# Patient Record
Sex: FEMALE | Race: WHITE
[De-identification: names, ages, dates, MRNs, and addresses within clinical notes are randomized per-mention and may not be internally consistent; named-entity substitution may affect disease eponyms.]

---

## 2017-07-20 ENCOUNTER — HOSPITAL ENCOUNTER (OUTPATIENT)
Dept: HOSPITAL 75 - RAD | Age: 52
End: 2017-07-20
Attending: FAMILY MEDICINE
Payer: COMMERCIAL

## 2017-07-20 DIAGNOSIS — Z12.31: Primary | ICD-10-CM

## 2017-07-20 PROCEDURE — 77067 SCR MAMMO BI INCL CAD: CPT

## 2017-07-26 NOTE — DIAGNOSTIC IMAGING REPORT
Bilateral screening mammogram 2D views with tomosynthesis.



The current study was also evaluated with a Computer Aided

Detection (CAD) system.



INDICATION: Screening. No current complaints stated on the

questionnaire.



COMPARISON: 7/18/16.



FINDINGS:



The breasts are composed of heterogeneously dense parenchyma

which would decrease mammographic sensitivity. Benign-appearing

calcification seen. Allowing for technique and positional

differences, no suspicious change is seen.



IMPRESSION: Dense breasts with no definite change. 



ACR BI-RADS Category 2: Benign findings.

Result letter will be mailed to the patient.

Note: At least 10% of breast cancer is not imaged by mammography.



Dictated by: 



  Dictated on workstation # [id number]

## 2018-03-11 ENCOUNTER — HOSPITAL ENCOUNTER (EMERGENCY)
Dept: HOSPITAL 75 - ER | Age: 53
Discharge: HOME | End: 2018-03-11
Payer: COMMERCIAL

## 2018-03-11 VITALS — WEIGHT: 175 LBS | BODY MASS INDEX: 29.16 KG/M2 | HEIGHT: 65 IN

## 2018-03-11 VITALS — DIASTOLIC BLOOD PRESSURE: 85 MMHG | SYSTOLIC BLOOD PRESSURE: 125 MMHG

## 2018-03-11 DIAGNOSIS — N39.0: Primary | ICD-10-CM

## 2018-03-11 DIAGNOSIS — Z88.0: ICD-10-CM

## 2018-03-11 DIAGNOSIS — Z88.1: ICD-10-CM

## 2018-03-11 LAB
APTT PPP: YELLOW S
BACTERIA #/AREA URNS HPF: (no result) /HPF
BILIRUB UR QL STRIP: NEGATIVE
FIBRINOGEN PPP-MCNC: (no result) MG/DL
GLUCOSE UR STRIP-MCNC: NEGATIVE MG/DL
KETONES UR QL STRIP: NEGATIVE
LEUKOCYTE ESTERASE UR QL STRIP: (no result)
NITRITE UR QL STRIP: NEGATIVE
PH UR STRIP: 6 [PH] (ref 5–9)
PROT UR QL STRIP: NEGATIVE
RBC #/AREA URNS HPF: >100 /HPF
SP GR UR STRIP: 1.02 (ref 1.02–1.02)
SQUAMOUS #/AREA URNS HPF: (no result) /HPF
UROBILINOGEN UR-MCNC: NORMAL MG/DL

## 2018-03-11 PROCEDURE — 87088 URINE BACTERIA CULTURE: CPT

## 2018-03-11 PROCEDURE — 81000 URINALYSIS NONAUTO W/SCOPE: CPT

## 2018-03-11 PROCEDURE — 99283 EMERGENCY DEPT VISIT LOW MDM: CPT

## 2018-03-11 NOTE — ED GU-FEMALE
General


Chief Complaint:  -Female


Stated Complaint:  RIGHT SIDE LOWER BACK PAIN


Nursing Triage Note:  


PATIENT STATES THAT SHE STARTED HAVING RIDED LOWER BACK PAIN THAT STARTED AT 


1845 TONIGHT. SHE STATES THAT SHE FEELS LIKE SHE NEEDS TO URINATE FREQUENTLY 

BUT 


CAN ONLY GO SMALL AMOUNTS AT A TIME. SHE ADMITS TO BURNING WITH URINATION. SHE 


IS NAUSEATED.


Nursing Sepsis Screen:  No Definite Risk





History of Present Illness


Date Seen by Provider:  Mar 11, 2018


Time Seen by Provider:  21:45


Initial Comments


52-year-old  female presents for acute onset of right-sided low back 

pain and suprapubic pain. She reports polyuria and dysuria. She reports no 

history of recent urinary tract infections.


Timing/Duration:  this evening


Severity/Quality:  moderate


Location:  suprapubic, right flank


Activities at Onset:  none


Prior Genitourinary Problems:  none


Associated Symptoms:  abdominal pain, dysuria, lower back pain, polyuria, 

urinary frequency





Allergies and Home Medications


Allergies


Coded Allergies:  


     Penicillins (Verified  Allergy, Unknown, RASH, 6/9/16)


     amoxicillin (Verified  Allergy, Unknown, RASH, 6/9/16)


     ampicillin (Verified  Allergy, Unknown, RASH, 6/9/16)





Home Medications


Amlodipine Besylate 10 Mg Tablet, 10 MG PO DAILY, (Reported)


Benazepril HCl 40 Mg Tab, 40 MG PO DAILY, (Reported)


Calcium Carbonate 500 Mg Tablet, 500 MG PO DAILY, (Reported)


Cholecalciferol (Vitamin D3) 1,000 Unit Tablet, 1,000 UNIT PO DAILY, (Reported)


Fenofibrate,Micronized 134 Mg Capsule, 134 MG PO DAILY, (Reported)


Hydrochlorothiazide 12.5 Mg Tablet, 12.5 MG PO DAILY, (Reported)


Loratadine 10 Mg Tablet, 10 MG PO DAILY, (Reported)


Multivitamin 1 Each Tablet, 1 EACH PO DAILY, (Reported)


Omega 3 Polyunsat Fatty Acids 1,000 Mg Cap, 1,000 MG PO DAILY, (Reported)


Phenazopyridine HCl 100 Mg Tablet, 100 MG PO Q8H PRN for SPASMS


   Prescribed by: NOVA FORTUNE on 3/11/18 2201


Sulfamethoxazole/Trimethoprim 1 Each Tablet, 1 EACH PO BID


   Prescribed by: NOVA FORTUNE on 3/11/18 2201





Patient Home Medication List


Home Medication List Reviewed:  Yes





Constitutional:  no symptoms reported, see HPI


Genitourinary:  see HPI, burning, dysuria, frequency, pain, urgency


All Other Systemes Reviewed


Negative Unless Noted:  Yes





Past Medical-Social-Family Hx


Patient Social History


Alcohol Use:  Denies Use


Recreational Drug Use:  No


Smoking Status:  Never a Smoker


2nd Hand Smoke Exposure:  No


Recent Foreign Travel:  No


Contact w/Someone Who Travel:  No


Recent Infectious Disease Expo:  No


Recent Hopitalizations:  No


Physical Abuse:  No


Sexual Abuse:  No





Seasonal Allergies


Seasonal Allergies:  No





Surgeries


History of Surgeries:  Yes





Respiratory


History of Respiratory Disorde:  No





Cardiovascular


History of Cardiac Disorders:  No





Gastrointestinal


History of Gastrointestinal Di:  No





Musculoskeletal


History of Musculoskeletal Dis:  No





Endocrine


History of Endocrine Disorders:  No





Psychosocial


Suicide Risk Score:  0





Reviewed Nursing Assessment


Reviewed/Agree w Nursing PMH:  Yes





Physical Exam


Vital Signs





Vital Signs - First Documented








 3/11/18





 21:13


 


Temp 97.3


 


Pulse 84


 


Resp 18


 


B/P (MAP) 125/85 (98)


 


Pulse Ox 99


 


O2 Delivery Room Air





Capillary Refill : Less Than 3 Seconds


General Appearance:  WD/WN, no apparent distress


HEENT:  PERRL/EOMI, normal ENT inspection


Cardiovascular:  normal peripheral pulses, regular rate, rhythm, no murmur


Respiratory:  chest non-tender, lungs clear, normal breath sounds


Gastrointestinal:  normal bowel sounds, soft, tenderness (trace suprapubic)


Back:  normal inspection, no vertebral tenderness, CVA tenderness (R)


Neurologic/Psychiatric:  no motor/sensory deficits, alert, normal mood/affect, 

oriented x 3





Progress/Results/Core Measures


Suspected Sepsis


Recent Fever Within 48 Hours:  No


Infection Criteria Present:  None


New/Unexplained  Altered Menta:  No


Sepsis Screen:  No Definite Risk


Sepsis Diagnosis:  


SIRS


Temperature:97.3 


Pulse: 84 


Respiratory Rate: 18


 


Blood Pressure 125 /85 


Mean: 98





Results/Orders


Lab Results





Laboratory Tests








Test


  3/11/18


20:45 Range/Units


 


 


Urine Color YELLOW   


 


Urine Clarity VERY CLOUDY H  


 


Urine pH 6  5-9  


 


Urine Specific Gravity 1.020  1.016-1.022  


 


Urine Protein NEGATIVE  NEGATIVE  


 


Urine Glucose (UA) NEGATIVE  NEGATIVE  


 


Urine Ketones NEGATIVE  NEGATIVE  


 


Urine Nitrite NEGATIVE  NEGATIVE  


 


Urine Bilirubin NEGATIVE  NEGATIVE  


 


Urine Urobilinogen NORMAL  NORMAL  MG/DL


 


Urine Leukocyte Esterase 2+ H NEGATIVE  


 


Urine RBC (Auto) 4+ H NEGATIVE  


 


Urine RBC >100 H  /HPF


 


Urine WBC 10-25 H  /HPF


 


Urine Squamous Epithelial


Cells 0-2 


   /HPF


 


 


Urine Crystals NONE   /LPF


 


Urine Bacteria MODERATE H  /HPF


 


Urine Casts NONE   /LPF


 


Urine Mucus NEGATIVE   /LPF


 


Urine Culture Indicated YES   








My Orders





Orders - NOVA FORTUNE


Rx-Trimeth/Sulfameth Ds Tab (Rx-Bactrim/ (3/11/18 21:53)


Phenazopyridine Tablet (Pyridium Tablet) (3/11/18 22:00)





Vital Signs/I&O





Vital Sign - Last 12Hours








 3/11/18 3/11/18





 21:13 22:13


 


Temp 97.3 97.3


 


Pulse 84 84


 


Resp 18 18


 


B/P (MAP) 125/85 (98) 125/85 (98)


 


Pulse Ox 99 99


 


O2 Delivery Room Air 





Capillary Refill : Less Than 3 Seconds








Blood Pressure Mean:  98











Departure


Impression


Impression:  


 Primary Impression:  


 Urinary tract infection


 Qualified Codes:  N30.01 - Acute cystitis with hematuria


Disposition:  01 HOME, SELF-CARE


Condition:  Stable





Departure-Patient Inst.


Decision time for Depature:  21:50


Referrals:  


MILADIS HORTA DO (PCP)


Primary Care Physician


Patient Instructions:  Urinary Tract Infection, Adult (DC)





Add. Discharge Instructions:  


Increase water intake.


1 glass of cranberry juice or eat 1 cup of fresh blueberries daily.


Empty bladder at least every 2 hours while awake.


Take all of antibiotic as prescribed.


May use Tylenol 650 mg or ibuprofen 600 mg alternating every 4 hours for pain 

or fever.


Use Pyridium as needed for pain and urinary frequency.


All up with Dr. Horta in 2-3 days if symptoms are not improving.


Return to emergency department for increased abdominal pain, fever greater than 

101 not relieved with Tylenol or ibuprofen, increased back pain, or new 

problems.








All discharge instructions reviewed with patient and/or family. Voiced 

understanding.


Scripts


Phenazopyridine HCl (Pyridium) 100 Mg Tablet


100 MG PO Q8H Y for SPASMS, #6 TAB 0 Refills


   Prov: NOVA FORTUNE         3/11/18 


Sulfamethoxazole/Trimethoprim (Bactrim Ds Tablet) 1 Each Tablet


1 EACH PO BID, #8 TAB 0 Refills


   Prov: NOVA FORTUNE         3/11/18





Copy


Copies To 1:   MILADIS HORTA AMY ARNP Mar 11, 2018 22:01

## 2018-03-12 NOTE — XMS REPORT
Continuity of Care Document

 Created on: 2018



GERDA ARROYO

External Reference #: [id number]

: 1965

Sex: Female



Demographics







 Address  [address] [id number]

 

 Home Phone  [phone] x

 

 Preferred Language  Unknown

 

 Marital Status  Unknown

 

 Mosque Affiliation  Unknown

 

 Race  Unknown

 

 Ethnic Group  Unknown





Author







 Author  [facility]

 

 Organization  [facility]

 

 Address  Unknown

 

 Phone  Unavailable



              



Allergies

      





 Active            Description            Code            Type            
Severity            Reaction            Onset            Reported/Identified   
         Relationship to Patient            Clinical Status        

 

 Yes            amoxicillin            [id number]            Drug Allergy      
      Unknown            RASH                         2016               
                   

 

 Yes            ampicillin            [id number]            Drug Allergy       
     Unknown            RASH                         2016                
                  

 

 Yes            Penicillins            [id number]            Drug Allergy      
      Unknown            RASH                         2016               
                   



                      



Medications

      



There is no data.                  



Problems

      





 Date Dx Coded            Attending            Type            Code            
Diagnosis            Diagnosed By        

 

 07/15/2015                         Ot            793.82                       
           

 

 07/15/2015                         Ot            V76.12                       
           

 

 07/15/2015                         Ot            793.89                       
           

 

 07/15/2015            MILADIS HORTA DO S            Ot            793.89
                                  

 

 07/15/2015            KERRY HORTA DOQUELINE S            Ot            793.89
                                  

 

 2015            KERRY HORTA DOQUELINE S            Ot            V76.12
                                  

 

 2016            MONIQUE DASILVA, EMIR GOMEZ            Ot            Z01.818   
         ENCOUNTER FOR OTHER PREPROCEDURAL EXAMIN                     

 

 06/10/2016            MONIQUE DASILVA, EMIR GOMEZ            Ot            Z01.818   
         ENCOUNTER FOR OTHER PREPROCEDURAL EXAMIN                     

 

 2016            MONIQUE DASILVA, EMIR GOMEZ            Ot            Z12.11    
        ENCOUNTER FOR SCREENING FOR MALIGNANT NE                     

 

 2016            MONIQUE DASILVA, EMIR GOMEZ            Ot            Z86.010   
         PERSONAL HISTORY OF COLONIC POLYPS                     

 

 2016            COLTONNDKERRY BENSON DOQUELINE S            Ot            V76.12
            OTH SCREEN MAMMO-MALIGN NEOPLASM OF MIRANDA                     

 

 2016            COLTONNDKERRY BENSON DOQUELINE S            Ot            Z12.31
            ENCNTR SCREEN MAMMOGRAM FOR MALIGNANT NE                     

 

 2016            KERRY HORTA DOQUELINE S            Ot            Z12.31
            ENCNTR SCREEN MAMMOGRAM FOR MALIGNANT NE                     

 

 2016            COLTONNDKERRY BENSON DOQUELINE S            Ot            Z12.31
            ENCNTR SCREEN MAMMOGRAM FOR MALIGNANT NE                     

 

 2017            KERRY HORTA DOQUELINE S            Ot            V76.12
            OTH SCREEN MAMMO-MALIGN NEOPLASM OF MIRANDA                     

 

 2017            COLTONNDER DO, MILADIS S            Ot            Z12.31
            ENCNTR SCREEN MAMMOGRAM FOR MALIGNANT NE                     

 

 2017                         Ot            793.82            
INCONCLUSIVE MAMMOGRAM                     

 

 2017                         Ot            V76.12            OTH SCREEN 
MAMMO-MALIGN NEOPLASM OF MIRANDA                     

 

 2017                         Ot            793.89            OTH (ABN) 
FINDINGS ON RADIOLOGICAL EXAMI                     

 

 2017            COLTONNDER DO, MILADIS S            Ot            793.89
            OTH (ABN) FINDINGS ON RADIOLOGICAL EXAMI                     

 

 2017            ORENDER DO, MILADIS S            Ot            793.89
            OTH (ABN) FINDINGS ON RADIOLOGICAL EXAMI                     

 

 2017            COLTONNDER DO, MILADIS S            Ot            V76.12
            OTH SCREEN MAMMO-MALIGN NEOPLASM OF MIRANDA                     

 

 2017            COLTONNDER DO, MILADIS S            Ot            Z12.31
            ENCNTR SCREEN MAMMOGRAM FOR MALIGNANT NE                     

 

 2017                         Ot            793.82            
INCONCLUSIVE MAMMOGRAM                     

 

 2017                         Ot            V76.12            OTH SCREEN 
MAMMO-MALIGN NEOPLASM OF MIRANDA                     

 

 2017                         Ot            793.89            OTH (ABN) 
FINDINGS ON RADIOLOGICAL EXAMI                     

 

 2017            COLTONNDER DO, MILADIS S            Ot            793.89
            OTH (ABN) FINDINGS ON RADIOLOGICAL EXAMI                     

 

 2017            COLTONNDER DO, MILADIS S            Ot            793.89
            OTH (ABN) FINDINGS ON RADIOLOGICAL EXAMI                     

 

 2017            COLTONNDER DO, MILADIS S            Ot            V76.12
            OTH SCREEN MAMMO-MALIGN NEOPLASM OF MIRANDA                     

 

 2017            COLTONNDER DO, MILADIS S            Ot            Z12.31
            ENCNTR SCREEN MAMMOGRAM FOR MALIGNANT NE                     

 

 2017            COLTONNDER DO, MILADIS S            Ot            Z12.31
            ENCNTR SCREEN MAMMOGRAM FOR MALIGNANT NE                     

 

 2017            ORENDER DO, MILADIS S            Ot            Z12.31
            ENCNTR SCREEN MAMMOGRAM FOR MALIGNANT NE                     

 

 2017            ORENDER DO, MILADIS S            Ot            Z12.31
            ENCNTR SCREEN MAMMOGRAM FOR MALIGNANT NE                     

 

 2017            ORENDER DO, MILADIS S            Ot            Z12.31
            ENCNTR SCREEN MAMMOGRAM FOR MALIGNANT NE                     

 

 2017            COLTONNDER DO, MILADIS S            Ot            Z12.31
            ENCNTR SCREEN MAMMOGRAM FOR MALIGNANT NE                     



                                                                               
       



Procedures

      



There is no data.                  



Results

      



There is no data.              



Encounters

      





 ACCT No.            Visit Date/Time            Discharge            Status    
        Pt. Type            Provider            Facility            Loc./Unit  
          Complaint        

 

 [id number]            2017 12:57:00            2017 23:59:59    
        CLS            Outpatient            ORENDER DO, MILADIS S          
  [facility]            RAD            SCREENING        

 

 [id number]            2016 07:23:00            2016 23:59:59    
        CLS            Outpatient            ORENDER DO, MILADIS S          
  [facility]            RAD            SCREENING        

 

 [id number]            2016 10:52:00            2016 14:00:00    
        DIS            Outpatient            EMIR AMAYA MD            
[facility]            SDC            SCREENING; HISTORY 
OF POLYPS        

 

 [id number]            2016 05:55:00            2016 10:14:00    
        DIS            Outpatient            EMIR AMAYA MD            
[facility]            PREOP            SCREENING; HISTORY 
OF POLYPS        

 

 [id number]            07/15/2015 09:09:00            07/15/2015 23:59:59    
        CLS            Outpatient            ORENDER DO, MILADIS S          
  [facility]            RAD            SCREENING        

 

 [id number]            2014 07:13:00            2014 23:59:59    
        CLS            Outpatient            ORENDER DO, MILADIS S          
  [facility]            RAD            SIX MONTH F/U     
   

 

 [id number]            10/24/2013 12:32:00            10/24/2013 23:59:59    
        CLS            Outpatient            ORENDER DO, MILADIS S          
  [facility]            RAD            6 MONTH FOLLOW UP 
       

 

 [id number]            2013 07:28:00                                   
   Document Registration                                                       
     

 

 [id number]            2013 10:42:00                                   
   Document Registration

## 2019-06-26 ENCOUNTER — HOSPITAL ENCOUNTER (OUTPATIENT)
Dept: HOSPITAL 75 - RAD | Age: 54
End: 2019-06-26
Attending: FAMILY MEDICINE
Payer: COMMERCIAL

## 2019-06-26 DIAGNOSIS — M19.042: Primary | ICD-10-CM

## 2019-06-26 PROCEDURE — 73140 X-RAY EXAM OF FINGER(S): CPT

## 2019-06-26 NOTE — DIAGNOSTIC IMAGING REPORT
INDICATION: 

Left hand swelling.



FINDINGS:

Three views of the left fifth finger show some degenerative

change of the proximal interphalangeal joint of the left fifth

finger.



IMPRESSION: 

Degenerative changes of the PIP joint of the left fifth finger.

No acute abnormality is seen.



Dictated by: 



  Dictated on workstation # [id number]

## 2019-07-09 ENCOUNTER — HOSPITAL ENCOUNTER (OUTPATIENT)
Dept: HOSPITAL 75 - RAD | Age: 54
End: 2019-07-09
Attending: FAMILY MEDICINE
Payer: COMMERCIAL

## 2019-07-09 DIAGNOSIS — Z12.31: Primary | ICD-10-CM

## 2019-07-09 PROCEDURE — 77067 SCR MAMMO BI INCL CAD: CPT

## 2019-07-09 NOTE — DIAGNOSTIC IMAGING REPORT
INDICATION: 

Routine screening.



COMPARISON:      

07/20/2017 and 07/18/2016.



TECHNIQUE: 

2D and 3D bilateral screening mammography was performed with CAD.



FINDINGS:

Both breasts remain heterogeneously dense, limiting the

sensitivity of mammography. There are benign calcifications

scattered throughout both breasts. No mass or malignant appearing

microcalcifications are seen. The axillae are unremarkable.



IMPRESSION:   

No mammographic features suspicious for malignancy are

identified.



ACR BI-RADS Category 2: Benign findings.

Result letter will be mailed to the patient.

Note: At least 10% of breast cancer is not imaged by mammography.



Dictated by: 



  Dictated on workstation # [id number]

## 2021-04-27 ENCOUNTER — HOSPITAL ENCOUNTER (EMERGENCY)
Dept: HOSPITAL 75 - ER | Age: 56
Discharge: HOME | End: 2021-04-27
Payer: COMMERCIAL

## 2021-04-27 VITALS — BODY MASS INDEX: 30.49 KG/M2 | WEIGHT: 182.98 LBS | HEIGHT: 64.96 IN

## 2021-04-27 VITALS — DIASTOLIC BLOOD PRESSURE: 96 MMHG | SYSTOLIC BLOOD PRESSURE: 169 MMHG

## 2021-04-27 DIAGNOSIS — Z88.0: ICD-10-CM

## 2021-04-27 DIAGNOSIS — K80.20: Primary | ICD-10-CM

## 2021-04-27 DIAGNOSIS — Z88.1: ICD-10-CM

## 2021-04-27 LAB
ALBUMIN SERPL-MCNC: 4.7 GM/DL (ref 3.2–4.5)
ALP SERPL-CCNC: 81 U/L (ref 40–136)
ALT SERPL-CCNC: 30 U/L (ref 0–55)
AMORPH SED URNS QL MICRO: (no result) /LPF
APTT PPP: YELLOW S
BACTERIA #/AREA URNS HPF: NEGATIVE /HPF
BASOPHILS # BLD AUTO: 0.1 10^3/UL (ref 0–0.1)
BASOPHILS NFR BLD AUTO: 1 % (ref 0–10)
BILIRUB SERPL-MCNC: 0.7 MG/DL (ref 0.1–1)
BILIRUB UR QL STRIP: NEGATIVE
BUN/CREAT SERPL: 21
CALCIUM SERPL-MCNC: 9.7 MG/DL (ref 8.5–10.1)
CHLORIDE SERPL-SCNC: 104 MMOL/L (ref 98–107)
CO2 SERPL-SCNC: 26 MMOL/L (ref 21–32)
CREAT SERPL-MCNC: 0.77 MG/DL (ref 0.6–1.3)
EOSINOPHIL # BLD AUTO: 0.1 10^3/UL (ref 0–0.3)
EOSINOPHIL NFR BLD AUTO: 1 % (ref 0–10)
FIBRINOGEN PPP-MCNC: CLEAR MG/DL
GFR SERPLBLD BASED ON 1.73 SQ M-ARVRAT: > 60 ML/MIN
GLUCOSE SERPL-MCNC: 122 MG/DL (ref 70–105)
GLUCOSE UR STRIP-MCNC: NEGATIVE MG/DL
HCT VFR BLD CALC: 39 % (ref 35–52)
HGB BLD-MCNC: 12.7 G/DL (ref 11.5–16)
KETONES UR QL STRIP: NEGATIVE
LEUKOCYTE ESTERASE UR QL STRIP: (no result)
LIPASE SERPL-CCNC: 30 U/L (ref 8–78)
LYMPHOCYTES # BLD AUTO: 1.3 10^3/UL (ref 1–4)
LYMPHOCYTES NFR BLD AUTO: 12 % (ref 12–44)
MANUAL DIFFERENTIAL PERFORMED BLD QL: NO
MCH RBC QN AUTO: 27 PG (ref 25–34)
MCHC RBC AUTO-ENTMCNC: 33 G/DL (ref 32–36)
MCV RBC AUTO: 84 FL (ref 80–99)
MONOCYTES # BLD AUTO: 0.5 10^3/UL (ref 0–1)
MONOCYTES NFR BLD AUTO: 5 % (ref 0–12)
NEUTROPHILS # BLD AUTO: 9 10^3/UL (ref 1.8–7.8)
NEUTROPHILS NFR BLD AUTO: 82 % (ref 42–75)
NITRITE UR QL STRIP: NEGATIVE
PH UR STRIP: 8.5 [PH] (ref 5–9)
PLATELET # BLD: 384 10^3/UL (ref 130–400)
PMV BLD AUTO: 9.7 FL (ref 9–12.2)
POTASSIUM SERPL-SCNC: 3.8 MMOL/L (ref 3.6–5)
PROT SERPL-MCNC: 7.8 GM/DL (ref 6.4–8.2)
PROT UR QL STRIP: (no result)
RBC #/AREA URNS HPF: (no result) /HPF
SODIUM SERPL-SCNC: 140 MMOL/L (ref 135–145)
SP GR UR STRIP: 1.01 (ref 1.02–1.02)
SQUAMOUS #/AREA URNS HPF: (no result) /HPF
WBC # BLD AUTO: 11 10^3/UL (ref 4.3–11)
WBC #/AREA URNS HPF: (no result) /HPF

## 2021-04-27 PROCEDURE — 80053 COMPREHEN METABOLIC PANEL: CPT

## 2021-04-27 PROCEDURE — 36415 COLL VENOUS BLD VENIPUNCTURE: CPT

## 2021-04-27 PROCEDURE — 85025 COMPLETE CBC W/AUTO DIFF WBC: CPT

## 2021-04-27 PROCEDURE — 81000 URINALYSIS NONAUTO W/SCOPE: CPT

## 2021-04-27 PROCEDURE — 83690 ASSAY OF LIPASE: CPT

## 2021-04-27 PROCEDURE — 76705 ECHO EXAM OF ABDOMEN: CPT

## 2021-04-27 NOTE — DIAGNOSTIC IMAGING REPORT
PROCEDURE: US Gallbladder.



TECHNIQUE: Multiple real-time grayscale images were obtained over

the right upper quadrant in various projections.



INDICATION:  Right upper quadrant pain.



FINDINGS: Liver parenchyma appears normal. Liver is not enlarged.

The gallbladder shows multiple mobile gallstones. Gallbladder

wall is mildly thickened. There is no pericholecystic fluid. The

common bile duct measures 6 mm. The pancreas is obscured due to

bowel gas shadowing. Aorta vena cava and portal vein appear

normal with Doppler sampling. Right kidney measures 11.8 x 5.3 x

5.3 cm. There is an anechoic cyst off the lower pole measuring 1

cm. There is no ascites.



IMPRESSION:

1. Cholelithiasis with multiple gallstones.

2. Small anechoic cyst lower pole of the right kidney.



Dictated by: 



  Dictated on workstation # [id number]

## 2021-04-27 NOTE — ED ABDOMINAL PAIN
General


Chief Complaint:  Abdominal/GI Problems


Stated Complaint:  RLQ PAIN


Source of Information:  Patient


Exam Limitations:  No Limitations





History of Present Illness


Date Seen by Provider:  Apr 27, 2021


Time Seen by Provider:  14:44


Initial Comments


To ER with right upper quadrant abdominal pain.  Yesterday she did not feel 

well, general malaise.  Today she was awakened at 430 this morning with some 

right upper quadrant abdominal pain.  She took some Tylenol which did seem to 

help.  She has vomited 4 or 5 times today.  She has a history of constipation 

and yesterday she took some MiraLAX thinking that could be part of her problem. 

Today the pain recurred and has been intermittent but quite severe when it 

returns.  Last food intake was a piece of toast at about 10 AM.  She had a 

bottle of water on the way here but vomited that up.


Timing/Duration:  1-2 Days


Severity/Quality:  Cramping


Location:  [facility]


Radiation:  No Radiation


Activities at Onset:  None





Allergies and Home Medications


Allergies


Coded Allergies:  


     Penicillins (Verified  Allergy, Unknown, RASH, 6/9/16)


     amoxicillin (Verified  Allergy, Unknown, RASH, 6/9/16)


     ampicillin (Verified  Allergy, Unknown, RASH, 6/9/16)





Home Medications


Amlodipine Besylate 10 Mg Tablet, 10 MG PO DAILY, (Reported)


Benazepril HCl 40 Mg Tab, 40 MG PO DAILY, (Reported)


Calcium Carbonate 500 Mg Tablet, 500 MG PO DAILY, (Reported)


Cholecalciferol (Vitamin D3) 1,000 Unit Tablet, 1,000 UNIT PO DAILY, (Reported)


Fenofibrate,Micronized 134 Mg Capsule, 134 MG PO DAILY, (Reported)


Hydrochlorothiazide 12.5 Mg Tablet, 12.5 MG PO DAILY, (Reported)


Hydrocodone/Acetaminophen 1 Each Tablet, 1 TAB PO Q4H PRN for PAIN-MODERATE (5-

7)


   Prescribed by: MARI SESAY on 4/27/21 1622


Loratadine 10 Mg Tablet, 10 MG PO DAILY, (Reported)


Multivitamin 1 Each Tablet, 1 EACH PO DAILY, (Reported)


Omega 3 Polyunsat Fatty Acids 1,000 Mg Cap, 1,000 MG PO DAILY, (Reported)


Ondansetron 8 Mg Tab.rapdis, 8 MG PO Q6H PRN for NAUSEA/VOMITING


   Prescribed by: MARI SESAY on 4/27/21 1622


Phenazopyridine HCl 100 Mg Tablet, 100 MG PO Q8H PRN for SPASMS


   Prescribed by: NOVA FORTUNE on 3/11/18 2201


Sulfamethoxazole/Trimethoprim 1 Each Tablet, 1 EACH PO BID


   Prescribed by: NOVA FORTUNE on 3/11/18 2201





Patient Home Medication List


Home Medication List Reviewed:  Yes





Review of Systems


Review of Systems


Constitutional:  see HPI


EENTM:  No Symptoms Reported


Respiratory:  No Symptoms Reported


Cardiovascular:  No Symptoms Reported


Gastrointestinal:  See HPI, Abdominal Pain, Nausea, Vomiting


Genitourinary:  No Symptoms Reported


Musculoskeletal:  no symptoms reported


Skin:  no symptoms reported


Psychiatric/Neurological:  No Symptoms Reported


Endocrine:  No Symptoms Reported


Hematologic/Lymphatic:  No Symptoms Reported





Past Medical-Social-Family Hx


Patient Social History


2nd Hand Smoke Exposure:  No


Recent Hopitalizations:  No





Seasonal Allergies


Seasonal Allergies:  No





Past Medical History


Surgeries:  Yes


Respiratory:  No


Cardiac:  No


Gastrointestinal:  No


Musculoskeletal:  No


Endocrine:  No





Physical Exam


Vital Signs





Vital Signs - First Documented








 4/27/21





 14:50


 


Temp 37.1


 


Pulse 89


 


Resp 16


 


B/P (MAP) 147/95 (112)


 


Pulse Ox 99


 


O2 Delivery Room Air





Capillary Refill :


Height/Weight/BMI


Height: 5'5.00"


Weight: 175lbs. 0.0oz. [id number]; 29.1 BMI


Method:Stated


General Appearance:  WD/WN, no apparent distress


HEENT:  PERRL/EOMI, normal ENT inspection


Respiratory:  no respiratory distress, no accessory muscle use


Cardiovascular:  regular rate, rhythm, no murmur


Gastrointestinal:  normal bowel sounds, soft, tenderness


Extremities:  normal range of motion, non-tender


Neurologic/Psychiatric:  alert, normal mood/affect, oriented x 3


Skin:  normal color, warm/dry





Progress/Results/Core Measures


Results/Orders


Lab Results





Laboratory Tests








Test


 4/27/21


14:50 4/27/21


14:55 Range/Units


 


 


White Blood Count


 11.0 


 


 4.3-11.0


10^3/uL


 


Red Blood Count


 4.66 


 


 3.80-5.11


10^6/uL


 


Hemoglobin 12.7   11.5-16.0  g/dL


 


Hematocrit 39   35-52  %


 


Mean Corpuscular Volume 84   80-99  fL


 


Mean Corpuscular Hemoglobin 27   25-34  pg


 


Mean Corpuscular Hemoglobin


Concent 33 


 


 32-36  g/dL





 


Red Cell Distribution Width 12.9   10.0-14.5  %


 


Platelet Count


 384 


 


 130-400


10^3/uL


 


Mean Platelet Volume 9.7   9.0-12.2  fL


 


Immature Granulocyte % (Auto) 0    %


 


Neutrophils (%) (Auto) 82 H  42-75  %


 


Lymphocytes (%) (Auto) 12   12-44  %


 


Monocytes (%) (Auto) 5   0-12  %


 


Eosinophils (%) (Auto) 1   0-10  %


 


Basophils (%) (Auto) 1   0-10  %


 


Neutrophils # (Auto)


 9.0 H


 


 1.8-7.8


10^3/uL


 


Lymphocytes # (Auto)


 1.3 


 


 1.0-4.0


10^3/uL


 


Monocytes # (Auto)


 0.5 


 


 0.0-1.0


10^3/uL


 


Eosinophils # (Auto)


 0.1 


 


 0.0-0.3


10^3/uL


 


Basophils # (Auto)


 0.1 


 


 0.0-0.1


10^3/uL


 


Immature Granulocyte # (Auto)


 0.0 


 


 0.0-0.1


10^3/uL


 


Sodium Level 140   135-145  MMOL/L


 


Potassium Level 3.8   3.6-5.0  MMOL/L


 


Chloride Level 104     MMOL/L


 


Carbon Dioxide Level 26   21-32  MMOL/L


 


Anion Gap 10   5-14  MMOL/L


 


Blood Urea Nitrogen 16   7-18  MG/DL


 


Creatinine


 0.77 


 


 0.60-1.30


MG/DL


 


Estimat Glomerular Filtration


Rate > 60 


 


  





 


BUN/Creatinine Ratio 21    


 


Glucose Level 122 H    MG/DL


 


Calcium Level 9.7   8.5-10.1  MG/DL


 


Corrected Calcium    8.5-10.1  MG/DL


 


Total Bilirubin 0.7   0.1-1.0  MG/DL


 


Aspartate Amino Transf


(AST/SGOT) 25 


 


 5-34  U/L





 


Alanine Aminotransferase


(ALT/SGPT) 30 


 


 0-55  U/L





 


Alkaline Phosphatase 81     U/L


 


Total Protein 7.8   6.4-8.2  GM/DL


 


Albumin 4.7 H  3.2-4.5  GM/DL


 


Lipase 30   8-78  U/L


 


Urine Color  YELLOW   


 


Urine Clarity  CLEAR   


 


Urine pH  8.5  5-9  


 


Urine Specific Gravity  1.015 L 1.016-1.022  


 


Urine Protein  TRACE H NEGATIVE  


 


Urine Glucose (UA)  NEGATIVE  NEGATIVE  


 


Urine Ketones  NEGATIVE  NEGATIVE  


 


Urine Nitrite  NEGATIVE  NEGATIVE  


 


Urine Bilirubin  NEGATIVE  NEGATIVE  


 


Urine Urobilinogen  0.2  < = 1.0  MG/DL


 


Urine Leukocyte Esterase  TRACE H NEGATIVE  


 


Urine RBC (Auto)  NEGATIVE  NEGATIVE  


 


Urine RBC  NONE   /HPF


 


Urine WBC  RARE   /HPF


 


Urine Squamous Epithelial


Cells 


 NONE 


  /HPF





 


Urine Crystals  NONE   /LPF


 


Urine Amorphous Sediment


 


 FEW KEITH


PHOSPHATE H  /LPF





 


Urine Bacteria  NEGATIVE   /HPF


 


Urine Casts  NONE   /LPF


 


Urine Mucus  NEGATIVE   /LPF


 


Urine Culture Indicated  NO   








My Orders





Orders - MARI SESAY APRN


Cbc With Automated Diff (4/27/21 14:48)


Comprehensive Metabolic Panel (4/27/21 14:48)


Lipase (4/27/21 14:48)


Ed Iv/Invasive Line Start (4/27/21 14:48)


Us Gallbladder 62072 (4/27/21 14:48)


Ondansetron Injection (Zofran Injectio (4/27/21 15:00)


Lactated Ringers (Lr 1000 Ml Iv Solution (4/27/21 15:00)





Vital Signs/I&O











 4/27/21





 14:50


 


Temp 37.1


 


Pulse 89


 


Resp 16


 


B/P (MAP) 147/95 (112)


 


Pulse Ox 99


 


O2 Delivery Room Air











Departure


Communication (Admissions)


7977 pain and nausea are now much better after the ultrasound.  She declined 

nausea or pain medication.  We will give her the IV fluids.  Ultrasound tech 

reports the common bile duct is normal diameter with no wall thickening but she 

does have some stones in the gallbladder.  Laboratory evaluation is 

unremarkable.  She works as a  and would like to have her 

gallbladder out sometime in June when she is on summer break.  Discussed with 

her that should be fine as long as her symptoms are tolerable until then.  I 

spoke with Dr. Collins and he agrees to see her outpatient.  She will call for an

appointment time.-





Impression





   Primary Impression:  


   Symptomatic cholelithiasis


Disposition:  01 HOME, SELF-CARE


Condition:  Stable





Departure-Patient Inst.


Decision time for Depature:  16:20


Referrals:  


MILADIS HORTA DO (PCP/Family)


Primary Care Physician


Patient Instructions:  Gallbladder Diet, Gallstones (DC)





Add. Discharge Instructions:  


1.  You have some stones in the gallbladder.  Avoid dairy products and fatty 

greasy foods.  Call Dr. Collins's office for an appointment to be seen.  He will 

see you and then schedule surgery with you at a date that is most convenient to 

you.  Take the pain medication and nausea medication as needed.  If these fail 

to resolve your symptoms or if you develop fevers you should return to the 

emergency room.





All discharge instructions reviewed with patient and/or family. Voiced 

understanding.


Scripts


Hydrocodone/Acetaminophen (Hydrocodone-Acetamin 5-325 mg) 1 Each Tablet


1 TAB PO Q4H PRN for PAIN-MODERATE (5-7), #10 TAB


   Prov: MARI SESAY         4/27/21 


Ondansetron (Ondansetron Odt) 8 Mg Tab.rapdis


8 MG PO Q6H PRN for NAUSEA/VOMITING, #14 TAB


   Prov: MARI SESAY         4/27/21





Copy


Copies To 1:   ILYA COLLINS PETER J APRN             Apr 27, 2021 14:54

## 2021-05-27 ENCOUNTER — HOSPITAL ENCOUNTER (OUTPATIENT)
Dept: HOSPITAL 75 - PREOP | Age: 56
LOS: 1 days | Discharge: HOME | End: 2021-05-28
Attending: SURGERY
Payer: COMMERCIAL

## 2021-05-27 VITALS — BODY MASS INDEX: 30.89 KG/M2 | HEIGHT: 65 IN | WEIGHT: 185.41 LBS

## 2021-05-27 DIAGNOSIS — Z01.818: Primary | ICD-10-CM

## 2021-06-03 ENCOUNTER — HOSPITAL ENCOUNTER (OUTPATIENT)
Dept: HOSPITAL 75 - SDC | Age: 56
Discharge: HOME | End: 2021-06-03
Attending: SURGERY
Payer: COMMERCIAL

## 2021-06-03 VITALS — HEIGHT: 65 IN | BODY MASS INDEX: 30.89 KG/M2 | WEIGHT: 185.41 LBS

## 2021-06-03 VITALS — SYSTOLIC BLOOD PRESSURE: 96 MMHG | DIASTOLIC BLOOD PRESSURE: 49 MMHG

## 2021-06-03 VITALS — DIASTOLIC BLOOD PRESSURE: 47 MMHG | SYSTOLIC BLOOD PRESSURE: 82 MMHG

## 2021-06-03 VITALS — DIASTOLIC BLOOD PRESSURE: 44 MMHG | SYSTOLIC BLOOD PRESSURE: 81 MMHG

## 2021-06-03 VITALS — SYSTOLIC BLOOD PRESSURE: 97 MMHG | DIASTOLIC BLOOD PRESSURE: 47 MMHG

## 2021-06-03 VITALS — DIASTOLIC BLOOD PRESSURE: 78 MMHG | SYSTOLIC BLOOD PRESSURE: 89 MMHG

## 2021-06-03 VITALS — DIASTOLIC BLOOD PRESSURE: 47 MMHG | SYSTOLIC BLOOD PRESSURE: 83 MMHG

## 2021-06-03 VITALS — SYSTOLIC BLOOD PRESSURE: 143 MMHG | DIASTOLIC BLOOD PRESSURE: 76 MMHG

## 2021-06-03 VITALS — DIASTOLIC BLOOD PRESSURE: 48 MMHG | SYSTOLIC BLOOD PRESSURE: 93 MMHG

## 2021-06-03 VITALS — DIASTOLIC BLOOD PRESSURE: 71 MMHG | SYSTOLIC BLOOD PRESSURE: 100 MMHG

## 2021-06-03 VITALS — SYSTOLIC BLOOD PRESSURE: 96 MMHG | DIASTOLIC BLOOD PRESSURE: 43 MMHG

## 2021-06-03 VITALS — SYSTOLIC BLOOD PRESSURE: 91 MMHG | DIASTOLIC BLOOD PRESSURE: 42 MMHG

## 2021-06-03 VITALS — DIASTOLIC BLOOD PRESSURE: 55 MMHG | SYSTOLIC BLOOD PRESSURE: 100 MMHG

## 2021-06-03 DIAGNOSIS — Z87.891: ICD-10-CM

## 2021-06-03 DIAGNOSIS — I10: ICD-10-CM

## 2021-06-03 DIAGNOSIS — K21.9: ICD-10-CM

## 2021-06-03 DIAGNOSIS — K82.8: ICD-10-CM

## 2021-06-03 DIAGNOSIS — Z79.899: ICD-10-CM

## 2021-06-03 DIAGNOSIS — K80.10: Primary | ICD-10-CM

## 2021-06-03 DIAGNOSIS — Z79.82: ICD-10-CM

## 2021-06-03 DIAGNOSIS — Z82.49: ICD-10-CM

## 2021-06-03 DIAGNOSIS — E78.5: ICD-10-CM

## 2021-06-03 PROCEDURE — 76000 FLUOROSCOPY <1 HR PHYS/QHP: CPT

## 2021-06-03 PROCEDURE — 87081 CULTURE SCREEN ONLY: CPT

## 2021-06-03 PROCEDURE — 88304 TISSUE EXAM BY PATHOLOGIST: CPT

## 2021-06-03 RX ADMIN — SODIUM CHLORIDE, SODIUM LACTATE, POTASSIUM CHLORIDE, AND CALCIUM CHLORIDE PRN MLS/HR: 600; 310; 30; 20 INJECTION, SOLUTION INTRAVENOUS at 10:14

## 2021-06-03 RX ADMIN — SODIUM CHLORIDE, SODIUM LACTATE, POTASSIUM CHLORIDE, AND CALCIUM CHLORIDE PRN MLS/HR: 600; 310; 30; 20 INJECTION, SOLUTION INTRAVENOUS at 08:17

## 2021-06-03 NOTE — ANESTHESIA-GENERAL POST-OP
General


Patient Condition


Mental Status/LOC:  Same as Preop


Cardiovascular:  Satisfactory


Nausea/Vomiting:  Absent


Respiratory:  Satisfactory


Pain:  Controlled


Complications:  Absent





Post Op Complications


Complications


None





Follow Up Care/Instructions


Patient Instructions


None needed.





Anesthesia/Patient Condition


Patient Condition


Patient is doing well, no complaints, stable vital signs, no apparent adverse 

anesthesia problems.











RADHA MARTINEZ DO          Joe 3, 2021 13:20

## 2021-06-03 NOTE — PROGRESS NOTE-PRE OPERATIVE
Pre-Operative Progress Note


H&P Reviewed


The H&P was reviewed, patient examined and no changes noted.


Date Seen by Provider:  Joe 3, 2021


Time Seen by Provider:  08:03


Date H&P Reviewed:  Joe 3, 2021


Time H&P Reviewed:  08:03


Pre-Operative Diagnosis:  symptomatic cholelithiasis











ILYA COLLINS DO               Joe 3, 2021 08:04

## 2021-06-03 NOTE — PROGRESS NOTE-POST OPERATIVE
Post-Operative Progess Note


Surgeon (s)/Assistant (s)


Surgeon


ILYA COLLINS DO


Assistant:  Dr. Hines to assist in retraction dissection and closure





Pre-Operative Diagnosis


symptomatic cholelithiasis





Post-Operative Diagnosis





same





Procedure & Operative Findings


Date of Procedure


6/3/21


Procedure Performed/Findings


  


PROCEDURE:


Laparoscopic cholecystectomy with intraoperative


cholangiogram. 


 


COMPLICATIONS:


None. 


 


PROCEDURE:


The patient was taken to the operating suite and was prepped and


draped in sterile fashion. A surgical pause was performed. Just


superior to the umbilicus, a 12 mm incision was made. Dissection


was taken down to the fascia, which was then scored and grasped


with a Kocher and the abdomen was then entered.  A 0 Vicryl


suture was placed in a figure-of-eight fashion and a Hernandez


trocar was placed and secured. Pneumoperitoneum was achieved.


A 5mm trochar place in the subxyphoid and 2 in the right upper quadrant.


The gallbladder was then grasped and elevated. Adhesions to gallbladder and


liver were taken down with blunt and cautery dissection. The


cystic duct, and cystic artery were then 


dissected out. Clip was placed on the distal


portion of the cystic duct which was then partially transected.


An arrow catheter was inserted into the duct. 


The cholangiogram was then performed. No filing defects and contrast


made its way into the duodenum.  Catheter removed. Clips were placed


on proximal portion of the cystic duct and then the duct was then


transected. Clips were placed along the proximal and distal


portion of the cystic artery which was then transected. Hook


cautery was used to dissect the gallbladder from the gallbladder


fossa achieving hemostasis. The gallbladder was placed in an


Endobag and removed through the 12 mm trocar site. The abdomen


was then reinspected.  Copious amounts of irrigation were used to


irrigate the abdomen and there were no signs of active bleeding.


Hemostasis had been achieved. The 12 mm fascial defect was then


closed with 0 Vicryl suture that had been placed in a


figure-of-eight fashion. The abdomen was then desufflated, the


trocars were removed. The abdomen was then washed and dried. The


skin was then closed using 4-0 Monocryl in a subcuticular fashion.


The abdomen was washed and dried and Skin Affix was place over incisions.


Patient tolerated the procedure well without any complications 


and was taken to the recovery room in stable condition.


Anesthesia Type


general





Estimated Blood Loss


Estimated blood loss (mL):  minimal





Specimens/Packing


Specimens Removed


gallbladder











ILYA COLLINS DO               Joe 3, 2021 10:26

## 2021-06-03 NOTE — DISCHARGE INST-SIMPLE/STANDARD
Discharge Inst-Standard


Discharge Medications


New, Converted or Re-Newed RX:  Transmitted to Pharmacy





Patient Instructions/Follow Up


Plan of Care/Instructions/FU:  


2-3 weeks Vic


Activity as Tolerated:  No


Discharge Diet:  Regular Diet


Other Inst to Patient


Follow up Appt:


Make appointment for 2-3 weeks.





Instructions:


No lifting greater than 10 pounds.


No strenuous activity. 


May shower in 24 hours, no tub bath or soaking.


Use incentive spirometer at home as directed.


No Smoking





Skin/Wound Care:


You have special glue over incision, it will fall off on it's own.





Symptoms to Report:


Appetite Changes, Extremity Discoloration, Numbness/Tingling, Swelling 

Increased, Bleeding Excessive, Eyesight Changes, Pain Increased, Urine Color 

Change, Constipation(Persistent), Fever over 101 degree F, Pain/Pressure in 

chest, Urinating Difficulty, Cough Up/Vomit Blood, Heart Beat Irreg/Pounding, 

Pain/Pressure in jaw, Vaginal Bleeding Increase, Cramps in feet or legs, 

Lightheadedness, Pain/Pressure in shoulder, Diarrhea(Persistent), Memory Changes

Suddenly, Questions/Concerns, Weight gain consecutive days, Dizziness/Fainting, 

Nausea/Vomiting, Shortness of Breath, Weight gain over 2 pounds.





If eyes or skin turn yellow notify physician.








If questions or concerns contact your physician 


Or seek help at emergency department.











ILYA COLLINS DO               Joe 3, 2021 10:27

## 2021-06-03 NOTE — DIAGNOSTIC IMAGING REPORT
INDICATION:  Right upper quadrant pain, cholelithiasis,

undergoing cholecystectomy



FINDINGS: Single static image along with a cine loop

intraoperative cholangiogram images submitted. There is

cannulation of the extra hepatic biliary tree. Images demonstrate

contrast opacification of the biliary system. Biliary tree is not

significantly dilated. There was no persistent filling defect to

indicate a retained stone. Flow was present into the duodenum.



IMPRESSION: Negative laparoscopic cholangiogram.





Fluoroscopic time: 10.7 seconds



Dictated by: 



  Dictated on workstation # [id number]

## 2021-06-06 ENCOUNTER — HOSPITAL ENCOUNTER (EMERGENCY)
Dept: HOSPITAL 75 - ER | Age: 56
Discharge: HOME | End: 2021-06-06
Payer: COMMERCIAL

## 2021-06-06 VITALS — DIASTOLIC BLOOD PRESSURE: 89 MMHG | SYSTOLIC BLOOD PRESSURE: 157 MMHG

## 2021-06-06 VITALS — BODY MASS INDEX: 30.49 KG/M2 | HEIGHT: 64.96 IN | WEIGHT: 182.98 LBS

## 2021-06-06 DIAGNOSIS — L03.311: Primary | ICD-10-CM

## 2021-06-06 DIAGNOSIS — Z88.0: ICD-10-CM

## 2021-06-06 DIAGNOSIS — Z79.82: ICD-10-CM

## 2021-06-06 DIAGNOSIS — Z79.899: ICD-10-CM

## 2021-06-06 DIAGNOSIS — E78.00: ICD-10-CM

## 2021-06-06 DIAGNOSIS — Z88.1: ICD-10-CM

## 2021-06-06 DIAGNOSIS — K21.9: ICD-10-CM

## 2021-06-06 DIAGNOSIS — I10: ICD-10-CM

## 2021-06-06 LAB
ALBUMIN SERPL-MCNC: 4.5 GM/DL (ref 3.2–4.5)
ALP SERPL-CCNC: 71 U/L (ref 40–136)
ALT SERPL-CCNC: 35 U/L (ref 0–55)
APTT PPP: YELLOW S
BACTERIA #/AREA URNS HPF: (no result) /HPF
BASOPHILS # BLD AUTO: 0.1 10^3/UL (ref 0–0.1)
BASOPHILS NFR BLD AUTO: 1 % (ref 0–10)
BILIRUB SERPL-MCNC: 0.6 MG/DL (ref 0.1–1)
BILIRUB UR QL STRIP: NEGATIVE
BUN/CREAT SERPL: 14
CALCIUM SERPL-MCNC: 9.3 MG/DL (ref 8.5–10.1)
CHLORIDE SERPL-SCNC: 105 MMOL/L (ref 98–107)
CO2 SERPL-SCNC: 21 MMOL/L (ref 21–32)
CREAT SERPL-MCNC: 0.86 MG/DL (ref 0.6–1.3)
EOSINOPHIL # BLD AUTO: 0.2 10^3/UL (ref 0–0.3)
EOSINOPHIL NFR BLD AUTO: 2 % (ref 0–10)
FIBRINOGEN PPP-MCNC: CLEAR MG/DL
GFR SERPLBLD BASED ON 1.73 SQ M-ARVRAT: > 60 ML/MIN
GLUCOSE SERPL-MCNC: 107 MG/DL (ref 70–105)
GLUCOSE UR STRIP-MCNC: NEGATIVE MG/DL
HCT VFR BLD CALC: 35 % (ref 35–52)
HGB BLD-MCNC: 12 G/DL (ref 11.5–16)
KETONES UR QL STRIP: NEGATIVE
LEUKOCYTE ESTERASE UR QL STRIP: NEGATIVE
LYMPHOCYTES # BLD AUTO: 2.2 10^3/UL (ref 1–4)
LYMPHOCYTES NFR BLD AUTO: 25 % (ref 12–44)
MANUAL DIFFERENTIAL PERFORMED BLD QL: NO
MCH RBC QN AUTO: 28 PG (ref 25–34)
MCHC RBC AUTO-ENTMCNC: 35 G/DL (ref 32–36)
MCV RBC AUTO: 82 FL (ref 80–99)
MONOCYTES # BLD AUTO: 0.5 10^3/UL (ref 0–1)
MONOCYTES NFR BLD AUTO: 6 % (ref 0–12)
NEUTROPHILS # BLD AUTO: 5.9 10^3/UL (ref 1.8–7.8)
NEUTROPHILS NFR BLD AUTO: 67 % (ref 42–75)
NITRITE UR QL STRIP: NEGATIVE
PH UR STRIP: 7 [PH] (ref 5–9)
PLATELET # BLD: 316 10^3/UL (ref 130–400)
PMV BLD AUTO: 9.8 FL (ref 9–12.2)
POTASSIUM SERPL-SCNC: 3.6 MMOL/L (ref 3.6–5)
PROT SERPL-MCNC: 7.6 GM/DL (ref 6.4–8.2)
PROT UR QL STRIP: NEGATIVE
RBC #/AREA URNS HPF: (no result) /HPF
SODIUM SERPL-SCNC: 139 MMOL/L (ref 135–145)
SP GR UR STRIP: <=1.005 (ref 1.02–1.02)
SQUAMOUS #/AREA URNS HPF: (no result) /HPF
WAXY CASTS #/AREA URNS LPF: (no result) /LPF
WBC # BLD AUTO: 8.9 10^3/UL (ref 4.3–11)
WBC #/AREA URNS HPF: (no result) /HPF

## 2021-06-06 PROCEDURE — 71045 X-RAY EXAM CHEST 1 VIEW: CPT

## 2021-06-06 PROCEDURE — 81000 URINALYSIS NONAUTO W/SCOPE: CPT

## 2021-06-06 PROCEDURE — 36415 COLL VENOUS BLD VENIPUNCTURE: CPT

## 2021-06-06 PROCEDURE — 86141 C-REACTIVE PROTEIN HS: CPT

## 2021-06-06 PROCEDURE — 80053 COMPREHEN METABOLIC PANEL: CPT

## 2021-06-06 PROCEDURE — 85025 COMPLETE CBC W/AUTO DIFF WBC: CPT

## 2021-06-06 NOTE — ED INTEGUMENTARY GENERAL
General


Chief Complaint:  Skin/Wound Problems


Stated Complaint:  FEVER/INCISION RED


Nursing Triage Note:  


ARRIVED VIA AMB TO ROOM 09.  GALLBLADDER REMOVED BY [facility] ON THURSDAY.  STARTED




HAVING A FEVER THAT NIGHT.  WAS PRESCRIBED AUGMENTIN ON FRIDAY BUT PHARMACY 


WOULD NOT FILL IT DUE TO HER ALLERGIES.  [facility] NOT AVAILABLE BY THE TIME THIS 


WAS FIGURED OUT AND NO ANTIBIOTICS HAVE BEEN TAKEN.


Source:  patient


Exam Limitations:  no limitations


 (JODIE VERDUGO MED STUDENT)





History of Present Illness


Date Seen by Provider:  2021


Time Seen by Provider:  15:21


Initial Comments


Mrs. Alcala is a 56 yo woman that presents today for some concerns with her 

surgical incision and a fever. She had cholecystectomy on thursday, done by Dr. Collins. Later that night after operation she had a temperature of 102.5. She to

ol tylenol, which brought her temperature back down into the 90's. She has not 

taken any of her hydrocodone due to its side effects. On friday, she called Dr. Collins's office and was told to alternate between tylenol and motrin. Her fever 

continued to undulate between 100's and 90's. She was also prescribed augmenten,

but pharmacy would not fill for her due to her having an ampicillin allergy. She

has had no antibiotics at this point. She complains of some redness spreading 

around the incision superior to the navel. No complaints about other sites. She 

describes minimal pain, some warmth, but denies bleeding, drainage, or 

dehiscence. She is eating a diet of soft foods currently and is having bowel 

movements.


Timing/Duration:  other (4 days)


Severity:  mild


Location:  torso (superior to navel)


Associated Symptoms:  fever, other (redness, mild tenderness) (JODIE VERDUGO MED

STUDENT)





Allergies and Home Medications


Allergies


Coded Allergies:  


     Penicillins (Verified  Allergy, Unknown, RASH, 16)


     amoxicillin (Verified  Allergy, Unknown, RASH, 16)


     ampicillin (Verified  Allergy, Unknown, RASH, 16)





Home Medications


Amlodipine Besylate 10 Mg Tablet, 10 MG PO DAILY, (Reported)


Aspirin 81 Mg Tab.chew, 81 MG PO DAILY, (Reported)


Benazepril HCl 40 Mg Tab, 40 MG PO DAILY, (Reported)


Calcium Carbonate 500 Mg Tablet, 500 MG PO DAILY, (Reported)


Cholecalciferol (Vitamin D3) 1,000 Unit Tablet, 1,000 UNIT PO DAILY, (Reported)


Docusate Sodium 100 Mg Capsule, 100 MG PO BID


   Prescribed by: ILYA COLLINS on 6/3/21 1027


Famotidine 20 Mg Tablet, 20 MG PO DAILY, (Reported)


Fenofibrate,Micronized 134 Mg Capsule, 134 MG PO DAILY, (Reported)


Hydrochlorothiazide 12.5 Mg Tablet, 12.5 MG PO DAILY, (Reported)


Loratadine 10 Mg Tablet, 10 MG PO DAILY, (Reported)


Multivitamin 1 Each Tablet, 1 EACH PO DAILY, (Reported)


Omega 3 Polyunsat Fatty Acids 1,000 Mg Cap, 1,000 MG PO DAILY, (Reported)





Patient Home Medication List


Home Medication List Reviewed:  Yes


 (JODIE VERDUGO MED STUDENT)





Review of Systems


Review of Systems


Constitutional:  chills, fever


EENTM:  No vision loss


Respiratory:  No cough, No phlegm, No short of breath


Cardiovascular:  No chest pain, No palpitations


Gastrointestinal:  No abdominal pain, No constipation, No diarrhea, No melena, 

No nausea, No vomiting


Genitourinary:  No dysuria, No frequency


Musculoskeletal:  No joint pain, No joint swelling


Skin:  other (redness, warmth) (JODIE VERDUGO)





Past Medical-Social-Family Hx


Patient Social History


Alcohol Use:  Denies Use


Smoking Status:  Never a Smoker


2nd Hand Smoke Exposure:  No


Recent Infectious Disease Expo:  No


Recent Hopitalizations:  No


 (JODIE VERDUGO)





Seasonal Allergies


Seasonal Allergies:  Yes


 (JODIE VERDUGO)





Past Medical History


Surgeries:  Yes


 Section, Gallbladder, Tonsillectomy


Respiratory:  No


Cardiac:  Yes


High Cholesterol, Hypertension


Neurological:  No


Female Reproductive Disorders:  Denies


GYN History:  Menopausal


Genitourinary:  No


Gastrointestinal:  Yes


Gastroesophageal Reflux, Gall Bladder Disease


Musculoskeletal:  No


Endocrine:  No


HEENT:  No


Cancer:  No


Psychosocial:  No


Integumentary:  No


Blood Disorders:  No


 (JODIE VERDUGO)





Physical Exam


Vital Signs





Vital Signs - First Documented








 21





 15:10


 


Temp 35.9


 


Pulse 90


 


Resp 16


 


B/P (MAP) 181/104 (129)


 


Pulse Ox 99


 


O2 Delivery Room Air





 (ELLIE MURILLO MD)


Vital Signs


Capillary Refill : Less Than 3 Seconds 


 (KARTIK,JODIE MED STUDENT)


General Appearance:  WD/WN, no apparent distress


Cardiovascular:  regular rate, rhythm, no edema, no murmur


Respiratory:  chest non-tender, lungs clear, normal breath sounds, no 

respiratory distress, no accessory muscle use


Gastrointestinal:  normal bowel sounds, non tender, soft, no organomegaly, no 

pulsatile mass


Extremities:  no pedal edema, no calf tenderness


Neurologic/Psychiatric:  alert, normal mood/affect, oriented x 3


Skin:  normal color, warm/dry, other (3 laproscpopic surgery sites noted on 

abdomen. Some bruising noted at all 3 sites. Sites on RLQ and RUQ appear normal 

for healing incisions. Minimal redness and no leaking. Site above naval slightly

erythematous, about 5cm in diameter, mildly tender to palpation, no leaking or 

bleeding noted, no dehiscence noted. Some warmth. ) (JODIE VERDUGO MED STUDENT)





Progress/Results/Core Measures


Results/Orders


Lab Results





Laboratory Tests








Test


 21


15:35 Range/Units


 


 


White Blood Count


 8.9 


 4.3-11.0


10^3/uL


 


Red Blood Count


 4.24 


 3.80-5.11


10^6/uL


 


Hemoglobin 12.0  11.5-16.0  g/dL


 


Hematocrit 35  35-52  %


 


Mean Corpuscular Volume 82  80-99  fL


 


Mean Corpuscular Hemoglobin 28  25-34  pg


 


Mean Corpuscular Hemoglobin


Concent 35 


 32-36  g/dL





 


Red Cell Distribution Width 12.4  10.0-14.5  %


 


Platelet Count


 316 


 130-400


10^3/uL


 


Mean Platelet Volume 9.8  9.0-12.2  fL


 


Immature Granulocyte % (Auto) 0   %


 


Neutrophils (%) (Auto) 67  42-75  %


 


Lymphocytes (%) (Auto) 25  12-44  %


 


Monocytes (%) (Auto) 6  0-12  %


 


Eosinophils (%) (Auto) 2  0-10  %


 


Basophils (%) (Auto) 1  0-10  %


 


Neutrophils # (Auto)


 5.9 


 1.8-7.8


10^3/uL


 


Lymphocytes # (Auto)


 2.2 


 1.0-4.0


10^3/uL


 


Monocytes # (Auto)


 0.5 


 0.0-1.0


10^3/uL


 


Eosinophils # (Auto)


 0.2 


 0.0-0.3


10^3/uL


 


Basophils # (Auto)


 0.1 


 0.0-0.1


10^3/uL


 


Immature Granulocyte # (Auto)


 0.0 


 0.0-0.1


10^3/uL


 


Urine Color YELLOW   


 


Urine Clarity CLEAR   


 


Urine pH 7.0  5-9  


 


Urine Specific Gravity <=1.005  1.016-1.022  


 


Urine Protein NEGATIVE  NEGATIVE  


 


Urine Glucose (UA) NEGATIVE  NEGATIVE  


 


Urine Ketones NEGATIVE  NEGATIVE  


 


Urine Nitrite NEGATIVE  NEGATIVE  


 


Urine Bilirubin NEGATIVE  NEGATIVE  


 


Urine Urobilinogen 0.2  < = 1.0  MG/DL


 


Urine Leukocyte Esterase NEGATIVE  NEGATIVE  


 


Urine RBC (Auto) NEGATIVE  NEGATIVE  


 


Urine RBC NONE   /HPF


 


Urine WBC 0-2   /HPF


 


Urine Squamous Epithelial


Cells RARE 


  /HPF





 


Urine Crystals NONE   /LPF


 


Urine Bacteria TRACE   /HPF


 


Urine Casts PRESENT   /LPF


 


Urine Waxy Casts 0-2 H  /LPF


 


Urine Mucus NEGATIVE   /LPF


 


Urine Culture Indicated NO   


 


Sodium Level 139  135-145  MMOL/L


 


Potassium Level 3.6  3.6-5.0  MMOL/L


 


Chloride Level 105    MMOL/L


 


Carbon Dioxide Level 21  21-32  MMOL/L


 


Anion Gap 13  5-14  MMOL/L


 


Blood Urea Nitrogen 12  7-18  MG/DL


 


Creatinine


 0.86 


 0.60-1.30


MG/DL


 


Estimat Glomerular Filtration


Rate > 60 


  





 


BUN/Creatinine Ratio 14   


 


Glucose Level 107 H   MG/DL


 


Calcium Level 9.3  8.5-10.1  MG/DL


 


Corrected Calcium 8.9  8.5-10.1  MG/DL


 


Total Bilirubin 0.6  0.1-1.0  MG/DL


 


Aspartate Amino Transf


(AST/SGOT) 24 


 5-34  U/L





 


Alanine Aminotransferase


(ALT/SGPT) 35 


 0-55  U/L





 


Alkaline Phosphatase 71    U/L


 


C-Reactive Protein High


Sensitivity 0.40 


 0.00-0.50


MG/DL


 


Total Protein 7.6  6.4-8.2  GM/DL


 


Albumin 4.5  3.2-4.5  GM/DL





 (ELLIE MURILLO MD)


My Orders





Orders - ELLIE MURILLO MD


Hs C Reactive Protein (21 15:28)


Ua Culture If Indicated (21 15:28)


Chest 1 View, Ap/Pa Only (21 15:28)


 (ELLIE MURILLO MD)


Vital Signs/I&O











 21





 15:10


 


Temp 35.9


 


Pulse 90


 


Resp 16


 


B/P (MAP) 181/104 (129)


 


Pulse Ox 99


 


O2 Delivery Room Air





 (ELLIE MURILLO MD)








Blood Pressure Mean:                    129











Progress


Progress Note :  


   Time:  16:16


Progress Note


Outline of redness traced with pen, measured with ruler, dimensions 5x5cm


Urine and labwork at this time do not suggest other source of infection


 (JODIE VERDUGO MED STUDENT)





Departure


Impression





   Primary Impression:  


   Cellulitis, abdominal wall


Disposition:   HOME, SELF-CARE


Condition:  Improved





Departure-Patient Inst.


Referrals:  


MILADIS HORTA DO (PCP/Family)


Primary Care Physician


Patient Instructions:  Cellulitis (Skin Infection), Adult (DC)





Add. Discharge Instructions:  


Fill your antibiotics tomorrow and complete the entire course.


Drink plenty of clear liquids to stay well-hydrated.


Return to care if you have worsening symptoms despite taking antibiotics.


Contacted Dr. Collins's office tomorrow to provide him an update.


Call with questions or concerns.





All discharge instructions reviewed with patient and/or family. Voiced unders

tanding.


Scripts


Cephalexin (Cephalexin) 500 Mg Tablet


500 MG PO QID, #28 TAB


   Prov: ELLIE MURILLO MD         21











JODIE VERDUGO MED STUDENT       2021 15:26


ELLIE MURILLO MD         2021 17:08

## 2021-06-06 NOTE — DIAGNOSTIC IMAGING REPORT
INDICATION: Fever.  



TECHNIQUE: Single view chest, 3:51 p.m.



CORRELATION STUDY: None.



FINDINGS: The heart size, mediastinal configuration and pulmonary

vascularity are within normal limits. The lungs are clear with no

consolidating infiltrate. There is no significant effusion or

pneumothorax. 



IMPRESSION: Negative appearing portable chest. 



Dictated by: 



  Dictated on workstation # [id number]

## 2021-07-26 ENCOUNTER — HOSPITAL ENCOUNTER (OUTPATIENT)
Dept: HOSPITAL 75 - RAD | Age: 56
End: 2021-07-26
Attending: FAMILY MEDICINE
Payer: COMMERCIAL

## 2021-07-26 DIAGNOSIS — Z12.31: Primary | ICD-10-CM

## 2021-07-26 PROCEDURE — 77067 SCR MAMMO BI INCL CAD: CPT

## 2021-07-26 PROCEDURE — 77063 BREAST TOMOSYNTHESIS BI: CPT

## 2021-07-26 NOTE — DIAGNOSTIC IMAGING REPORT
INDICATION: 

Routine screening.



COMPARISON:     

07/09/2019 and 07/20/2017.



TECHNIQUE: 

2D and 3D bilateral screening mammography was performed with CAD.



FINDINGS:

Both breasts are heterogeneously dense, limiting the sensitivity

of mammography. There are scattered benign calcifications

bilaterally. No mass or malignant-appearing microcalcifications

are seen. The axillae are unremarkable.



IMPRESSION: 

No mammographic features suspicious for malignancy are

identified.



ACR BI-RADS Category 2: Benign findings.

Result letter will be mailed to the patient.

Note: At least 10% of breast cancer is not imaged by mammography.



Dictated by: 



  Dictated on workstation # [id number]

## 2021-07-29 ENCOUNTER — HOSPITAL ENCOUNTER (OUTPATIENT)
Dept: HOSPITAL 75 - PREOP | Age: 56
LOS: 1 days | Discharge: HOME | End: 2021-07-30
Attending: INTERNAL MEDICINE
Payer: COMMERCIAL

## 2021-07-29 VITALS — BODY MASS INDEX: 30.01 KG/M2 | HEIGHT: 65 IN | WEIGHT: 180.12 LBS

## 2021-07-29 DIAGNOSIS — Z01.818: Primary | ICD-10-CM

## 2021-07-29 NOTE — HISTORY AND PHYSICAL
DATE OF SERVICE:  



COLONOSCOPY HISTORY AND PHYSICAL



HISTORY OF PRESENT ILLNESS:

The patient is a 55-year-old white female referred by Dr. Lee for screening

colonoscopy.  She does report a past history of colon polyps, several were

removed over 5 years ago per Dr. Paul and had been recommended that she

undergo repeat surveillance in 5 years.  She reports some chronic intermittent

constipation.  No change in bowel habit.  She will occasionally note some bright

red blood per rectum, painless that she attributes to hemorrhoids usually during

periods of constipation.  There is no associated pain.  She has had no change in

weight and denies melena.



PAST MEDICAL HISTORY:

Significant for hypertension with no known history of coronary artery disease as

well as hyperlipidemia and seasonal allergies.



MEDICATIONS ON ADMISSION:

Hydrochlorothiazide 12.5 mg daily, amlodipine 10 mg daily, benazepril 40 mg

daily and fenofibrate 134 mg daily with loratadine 10 mg daily.



PAST SURGICAL HISTORY:

She had a cholecystectomy for acute cholecystitis on the  of this

year.  She had a  in  and a tonsillectomy and adenoidectomy in

.



SOCIAL HISTORY:

She is employed with no past smoking or significant drinking history.



FAMILY HISTORY:

She is not aware of any family history for GI tract malignancy.  Mother is

living at the age of 75 with no health problems.  Father living at the age of 78

with history of mitral valve replacement as well as coronary artery disease

requiring stent placement.



REVIEW OF SYSTEMS:

CONSTITUTIONAL:  She denies change in weight, night sweats, chills or fever.

GASTROINTESTINAL:  As noted in the HPI.

CARDIOVASCULAR:  Denies chest pain, orthopnea, PND, pedal edema or syncope.

PULMONARY:  Denies cough, wheezing, shortness of breath or dyspnea on exertion.



PHYSICAL EXAMINATION:

GENERAL:  Reveals a well-appearing white female in no acute distress.

VITAL SIGNS:  Weight 185 pounds, blood pressure 134/76.

HEENT:  Unremarkable.

CHEST:  Clear to auscultation.

CARDIOVASCULAR:  Reveals a regular rate and rhythm without murmur, S3 or S4.

ABDOMEN:  Soft, supple without mass, organomegaly or tenderness.  Well-healed

trocar sites noted.  No significant tenderness.

EXTREMITIES:  Reveal no cyanosis, clubbing or edema.



ASSESSMENT AND PLAN:

The patient is set up for surveillance colonoscopy on 2021.  Prep

instructions with the Suprep kit were given and questions were answered.



I thank you for the referral of this pleasant lady.





Job ID: [id number]

DocumentID: [id number]

Dictated Date:  2021 17:23:25

Transcription Date: 2021 17:35:04

Dictated By: VICKIE GARAY MD

## 2021-08-06 ENCOUNTER — HOSPITAL ENCOUNTER (OUTPATIENT)
Dept: HOSPITAL 75 - ENDO | Age: 56
Discharge: HOME | End: 2021-08-06
Attending: INTERNAL MEDICINE
Payer: COMMERCIAL

## 2021-08-06 VITALS — SYSTOLIC BLOOD PRESSURE: 106 MMHG | DIASTOLIC BLOOD PRESSURE: 62 MMHG

## 2021-08-06 VITALS — SYSTOLIC BLOOD PRESSURE: 115 MMHG | DIASTOLIC BLOOD PRESSURE: 68 MMHG

## 2021-08-06 VITALS — DIASTOLIC BLOOD PRESSURE: 61 MMHG | SYSTOLIC BLOOD PRESSURE: 101 MMHG

## 2021-08-06 VITALS — HEIGHT: 65 IN | BODY MASS INDEX: 30.01 KG/M2 | WEIGHT: 180.12 LBS

## 2021-08-06 VITALS — DIASTOLIC BLOOD PRESSURE: 83 MMHG | SYSTOLIC BLOOD PRESSURE: 133 MMHG

## 2021-08-06 DIAGNOSIS — Z90.49: ICD-10-CM

## 2021-08-06 DIAGNOSIS — Z90.89: ICD-10-CM

## 2021-08-06 DIAGNOSIS — Z12.11: Primary | ICD-10-CM

## 2021-08-06 DIAGNOSIS — I10: ICD-10-CM

## 2021-08-06 DIAGNOSIS — E78.5: ICD-10-CM

## 2021-08-06 DIAGNOSIS — D12.5: ICD-10-CM

## 2021-08-06 DIAGNOSIS — Z79.899: ICD-10-CM

## 2021-08-06 DIAGNOSIS — E66.9: ICD-10-CM

## 2021-08-06 DIAGNOSIS — K21.9: ICD-10-CM

## 2021-08-06 PROCEDURE — 88305 TISSUE EXAM BY PATHOLOGIST: CPT

## 2021-08-06 NOTE — PRE-OP NOTE & CONSCIOUS SEDAT
Pre-Operative Progress Note


H&P Reviewed


The H&P was reviewed, patient examined and no changes noted.


Date H&P Reviewed:  Aug 6, 2021


Time H&P Reviewed:  07:40





Conscious Sedation Pre-Proced


ASA Score


2


For ASA 3 and 4: Consider anesthesia and medical clearance. Also, for patients

with a history of failed moderate sedation consider anesthesia.

















Airway 


 


Lungs 


 


Heart 


 


 ASA score


 


 ASA 1: a normal healthy patient


 


 ASA 2:  a patient with a mild systemic disease (mid diabetes, controlled 

hypertension, obesity 


 


 ASA 3:  a patient with a severe systemic disease that limits activity  (angina,

COPD, prior Myocardial infarction)


 


 ASA 4:  a patient with an incapacitating disease that is a constant threat to 

life (CHF, renal failure)


 


 ASA 5:  a moribund patient not expected to survive 24 hrs.  (ruptured aneurysm)


 


 ASA 6:  a declared brain-dead patient whose organs are being harvested.


 


 For emergent operations, add the letter E after the classification











Mallampati Classification


Grade 2





Sedation Plan


Analgesia, Amnesia, Plan communicated to team members, Discussed options with 

patient/fam, Discussed risks with patient/fam


The patient is an appropriate candidate to undergo the planned procedure, 

sedation, and anesthesia.





The patient immediately re-assessed prior to indication.











VICKIE GARAY MD               Aug 6, 2021 07:56

## 2021-08-06 NOTE — ANESTHESIA-GENERAL POST-OP
MAC


Patient Condition


Mental Status/LOC:  Same as Preop


Cardiovascular:  Satisfactory


Nausea/Vomiting:  Absent


Respiratory:  Satisfactory


Pain:  Controlled


Complications:  Absent





Post Op Complications


Complications


None





Follow Up Care/Instructions


Patient Instructions


None needed.





Anesthesiology Discharge Order


Discharge Order


Patient is doing well, no complaints, stable vital signs, no apparent adverse 

anesthesia problems.   


No complications reported per nursing.











TISH DONIS CRNA           Aug 6, 2021 09:42

## 2021-08-06 NOTE — OPERATIVE REPORT
DATE OF SERVICE:  



COLONOSCOPY SUMMARY



INDICATION FOR THE PROCEDURE:

Screening.



DESCRIPTION OF PROCEDURE:

The patient was placed in the left lateral decubitus position.  Prior to

undergoing colonoscopy, digital rectal evaluation was performed.  Anal sphincter

tone was normal and the perianal reflexes intact.  No abnormalities were noted

on digital inspection of anal canal or distal rectal vault.  The colonoscope was

inserted into the rectum and under direct visualization advanced to cecum.  The

cecum was identified by identification of ileocecal valve and cecal strap. 

Photographic documentation was obtained.  Careful inspection was made as

colonoscope was withdrawn.  Quality of prep was good.



FINDINGS:

There was no evidence for internal or external hemorrhoids and the rectum was

unremarkable.  A 6 mm sessile adenomatous appearing polyp was noted in the

distal sigmoid colon 25 cm from the anal verge.  It was biopsied and ablated

after photography with no subsequent blood loss.  The remainder of the sigmoid

colon was unremarkable.  The descending colon, splenic flexure, transverse colon

and hepatic flexure were unremarkable.  Present in the mid ascending colon was a

diminutive 1 mm hyperplastic-appearing polyp.  It was removed via cold forceps

after photography with several drop blood loss and discarded.  The remainder of

the ascending colon and cecum were unremarkable.



ASSESSMENT:

Two polyps removed the more significant in the distal sigmoid colon

compatible with a sessile adenoma measuring approximately 6 mm in size.  One

hyperplastic appearing polyp was removed from the mid ascending colon.  This was
an

otherwise normal colonoscopy to the cecum.  We would advocate consideration for

repeat surveillance colonoscopy in 5 years as long as there are no surprise on

histopathology report.



I thank you for the referral of this pleasant lady.





Job ID: [id number]

DocumentID: [id number]

Dictated Date:  08/06/2021 08:32:06

Transcription Date: 08/06/2021 10:04:22

Dictated By: VICKIE GARAY MD

[facility]

## 2022-11-08 ENCOUNTER — HOSPITAL ENCOUNTER (EMERGENCY)
Dept: HOSPITAL 75 - ER | Age: 57
Discharge: HOME | End: 2022-11-08
Payer: COMMERCIAL

## 2022-11-08 VITALS — DIASTOLIC BLOOD PRESSURE: 82 MMHG | SYSTOLIC BLOOD PRESSURE: 142 MMHG

## 2022-11-08 VITALS — HEIGHT: 64.96 IN | WEIGHT: 178.57 LBS | BODY MASS INDEX: 29.75 KG/M2

## 2022-11-08 DIAGNOSIS — Z20.822: ICD-10-CM

## 2022-11-08 DIAGNOSIS — K52.9: Primary | ICD-10-CM

## 2022-11-08 DIAGNOSIS — Z88.0: ICD-10-CM

## 2022-11-08 DIAGNOSIS — Z28.310: ICD-10-CM

## 2022-11-08 LAB
ALBUMIN SERPL-MCNC: 4.4 GM/DL (ref 3.2–4.5)
ALP SERPL-CCNC: 73 U/L (ref 40–136)
ALT SERPL-CCNC: 33 U/L (ref 0–55)
APTT PPP: YELLOW S
BACTERIA #/AREA URNS HPF: NEGATIVE /HPF
BASOPHILS # BLD AUTO: 0.1 10^3/UL (ref 0–0.1)
BASOPHILS NFR BLD AUTO: 1 % (ref 0–10)
BILIRUB SERPL-MCNC: 0.9 MG/DL (ref 0.1–1)
BILIRUB UR QL STRIP: NEGATIVE
BUN/CREAT SERPL: 10
CALCIUM SERPL-MCNC: 9.6 MG/DL (ref 8.5–10.1)
CHLORIDE SERPL-SCNC: 106 MMOL/L (ref 98–107)
CO2 SERPL-SCNC: 20 MMOL/L (ref 21–32)
CREAT SERPL-MCNC: 0.78 MG/DL (ref 0.6–1.3)
EOSINOPHIL # BLD AUTO: 0.1 10^3/UL (ref 0–0.3)
EOSINOPHIL NFR BLD AUTO: 1 % (ref 0–10)
FIBRINOGEN PPP-MCNC: CLEAR MG/DL
GFR SERPLBLD BASED ON 1.73 SQ M-ARVRAT: 89 ML/MIN
GLUCOSE SERPL-MCNC: 119 MG/DL (ref 70–105)
GLUCOSE UR STRIP-MCNC: NEGATIVE MG/DL
HCT VFR BLD CALC: 40 % (ref 35–52)
HGB BLD-MCNC: 13.6 G/DL (ref 11.5–16)
KETONES UR QL STRIP: NEGATIVE
LEUKOCYTE ESTERASE UR QL STRIP: NEGATIVE
LYMPHOCYTES # BLD AUTO: 1.4 10^3/UL (ref 1–4)
LYMPHOCYTES NFR BLD AUTO: 14 % (ref 12–44)
MANUAL DIFFERENTIAL PERFORMED BLD QL: NO
MCH RBC QN AUTO: 29 PG (ref 25–34)
MCHC RBC AUTO-ENTMCNC: 34 G/DL (ref 32–36)
MCV RBC AUTO: 84 FL (ref 80–99)
MONOCYTES # BLD AUTO: 0.5 10^3/UL (ref 0–1)
MONOCYTES NFR BLD AUTO: 5 % (ref 0–12)
NEUTROPHILS # BLD AUTO: 8 10^3/UL (ref 1.8–7.8)
NEUTROPHILS NFR BLD AUTO: 80 % (ref 42–75)
NITRITE UR QL STRIP: NEGATIVE
PH UR STRIP: 5.5 [PH] (ref 5–9)
PLATELET # BLD: 355 10^3/UL (ref 130–400)
PMV BLD AUTO: 9.7 FL (ref 9–12.2)
POTASSIUM SERPL-SCNC: 3.3 MMOL/L (ref 3.6–5)
PROT SERPL-MCNC: 7 GM/DL (ref 6.4–8.2)
PROT UR QL STRIP: NEGATIVE
RBC #/AREA URNS HPF: (no result) /HPF
RENAL EPI CELLS #/AREA URNS HPF: (no result) /HPF
SODIUM SERPL-SCNC: 139 MMOL/L (ref 135–145)
SP GR UR STRIP: 1.01 (ref 1.02–1.02)
SQUAMOUS #/AREA URNS HPF: (no result) /HPF
WBC # BLD AUTO: 10 10^3/UL (ref 4.3–11)
WBC #/AREA URNS HPF: (no result) /HPF

## 2022-11-08 PROCEDURE — 87636 SARSCOV2 & INF A&B AMP PRB: CPT

## 2022-11-08 PROCEDURE — 36415 COLL VENOUS BLD VENIPUNCTURE: CPT

## 2022-11-08 PROCEDURE — 86141 C-REACTIVE PROTEIN HS: CPT

## 2022-11-08 PROCEDURE — 74177 CT ABD & PELVIS W/CONTRAST: CPT

## 2022-11-08 PROCEDURE — 80053 COMPREHEN METABOLIC PANEL: CPT

## 2022-11-08 PROCEDURE — 74022 RADEX COMPL AQT ABD SERIES: CPT

## 2022-11-08 PROCEDURE — 85025 COMPLETE CBC W/AUTO DIFF WBC: CPT

## 2022-11-08 PROCEDURE — 93005 ELECTROCARDIOGRAM TRACING: CPT

## 2022-11-08 PROCEDURE — 84484 ASSAY OF TROPONIN QUANT: CPT

## 2022-11-08 PROCEDURE — 81000 URINALYSIS NONAUTO W/SCOPE: CPT

## 2022-11-08 NOTE — DIAGNOSTIC IMAGING REPORT
PROCEDURE: CT abdomen and pelvis with contrast.



TECHNIQUE: Multiple contiguous axial images were obtained through

the abdomen and pelvis after administration of intravenous

contrast. Auto Exposure Controls were utilized during the CT exam

to meet ALARA standards for radiation dose reduction. All CT

scans use one or more of the following dose optimizing

techniques: automated exposure control, MA and/or KvP adjustment

based on patient size and exam type or iterative reconstruction.



INDICATION:  Abdominal pain and nausea. 



There is calcified granuloma to the right of midline in the

posterior mediastinum. There is also low-density throughout the

liver indicating steatosis. Gallbladder is surgically absent

without significant biliary ductal dilatation. Stomach is

distended with fluid. No pancreatic, adrenal gland or splenic

abnormality is identified. There is an approximately 0.5 cm

nonobstructing stone in the lower pole of the left kidney.

Subcentimeter cortical cysts are also present in the lower poles

of the kidneys. There is fluid distention of small bowel. There

does appear to be at least mild to moderate mural thickening of

small bowel loops in the left abdomen. There is no evidence of

obstruction. Small amount of pelvic free fluid is seen. Urinary

bladder is unremarkable.



IMPRESSION: Hepatic steatosis and nonobstructing left

nephrolithiasis with mural thickening of small bowel in the left

abdomen suggestive of enteritis.



Dictated by: 



  Dictated on workstation # [id number]

## 2022-11-08 NOTE — DIAGNOSTIC IMAGING REPORT
INDICATION: Abdominal pain with nausea and emesis. 



Supine and upright views of the abdomen are obtained with single

view of the chest.



Heart size and pulmonary vascularity are within normal limits.

Calcified granulomas are seen in the right hilum and lung. Below

the diaphragm there is an approximately 0.4 cm calcification

projected over the lower pole of the left kidney. Surgical clips

are seen in the gallbladder fossa. Otherwise, no bowel

obstruction or other pathological abdominal calcification is

seen.



IMPRESSION: Granulomatous residua in the chest with probable

nonobstructing left lower pole renal calculus. Otherwise, no

acute abnormality is seen.



Dictated by: 



  Dictated on workstation # [id number]

## 2022-11-08 NOTE — ED ABDOMINAL PAIN
General


Chief Complaint:  Abdominal/GI Problems


Stated Complaint:  STOMACH PAIN


Nursing Triage Note:  


ARRIVED VIA AMB TO ROOM 02 WITH ABD PAIN AND NAUSEA STARTING YESTERDAY.  PT 


STATES SHE VOMITED TODAY AND HAS BEEN BERPING AND FARTING MORE THEN NORMAL.  PT 


ALSO STATES THE PAIN GOES INTO HER BACK AND SHE HAS HAD HER GALLBLADDER REMOVED.


Source of Information:  Patient


Exam Limitations:  No Limitations





History of Present Illness


Date Seen by Provider:  2022


Time Seen by Provider:  18:59


Initial Comments


This is a 58 yo female who presented to the ER via POV with c/o intermittent 

sharp abdominal pain, nausea, vomiting.





Allergies and Home Medications


Allergies


Coded Allergies:  


     cephalexin (Verified  Allergy, Severe, RASH, 22)


     Penicillins (Verified  Allergy, Unknown, RASH, 16)


     amoxicillin (Verified  Allergy, Unknown, RASH, 16)


     ampicillin (Verified  Allergy, Unknown, RASH, 16)





Patient Home Medication List


Amlodipine Besylate (Amlodipine Besylate) 10 Mg Tablet, 10 MG PO DAILY, 

(Reported)


   Entered as Reported by: SAI GUTIÉRREZ on 16 1012


Benazepril HCl (Benazepril HCl) 40 Mg Tab, 40 MG PO DAILY, (Reported)


   Entered as Reported by: SAI GUTIÉRREZ on 16 1012


Calcium Carbonate (Calcium) 500 Mg Tablet, 500 MG PO DAILY, (Reported)


   Entered as Reported by: ASI GUTIÉRREZ on 16 1012


Cholecalciferol (Vitamin D3) (Vitamin D) 1,000 Unit Tablet, 1,000 UNIT PO DAILY,

(Reported)


   Entered as Reported by: SAI GUTIÉRREZ on 16 1012


Ciprofloxacin HCl (Ciprofloxacin HCl) 500 Mg Tablet, 500 MG PO BID


   Prescribed by: ARUNA BENTLEY on 228


Fenofibrate,Micronized (Fenofibrate) 134 Mg Capsule, 134 MG PO DAILY, (Reported)


   Entered as Reported by: SAI GUTIÉRREZ on 16 1012


Hydrochlorothiazide (Hydrochlorothiazide) 12.5 Mg Tablet, 12.5 MG PO DAILY, 

(Reported)


   Entered as Reported by: SAI GUTIÉRREZ on 16 1012


Hydrocodone/Acetaminophen (Hydrocodone-Acetamin 5-325 mg) 5 Mg-325 Mg Tablet, 1 

TAB PO Q6H PRN for PAIN-MODERATE (5-7)


   Prescribed by: ARUNA BENTLEY on 22


Multivitamin (Multi-Vitamin Daily) 1 Each Tablet, 1 EACH PO DAILY, (Reported)


   Entered as Reported by: SAI GUTIÉRREZ on 16 1012


Omega 3 Polyunsat Fatty Acids (Fish Oil 1,000 mg Capsule) 1,000 Mg Cap, 1,000 MG

PO DAILY, (Reported)


   Entered as Reported by: SAI GUTIÉRREZ on 16 1012


Vitamin E Mixed (Vitamin E) 400 Unit Tablet, 400 UNIT PO DAILY, (Reported)


   Entered as Reported by: JULIE A IBEH on 21 0928





Past Medical-Social-Family Hx


Patient Social History


Smoking Status:  Never a Smoker


Substance use?:  No


Alcohol Use?:  No





Immunizations Up To Date


Tetanus Booster (TDap):  Unknown





Seasonal Allergies


Seasonal Allergies:  Yes





Past Medical History


Surgeries:  Yes


 Section, Gallbladder, Tonsillectomy


Respiratory:  No


Currently Using CPAP:  No


Currently Using BIPAP:  No


Cardiac:  Yes


High Cholesterol, Hypertension


Neurological:  No


Female Reproductive Disorders:  Denies


GYN History:  Menopausal


Genitourinary:  No


Gastrointestinal:  Yes


Gastroesophageal Reflux, Gall Bladder Disease


Musculoskeletal:  No


Endocrine:  No


HEENT:  No


Cancer:  No


Psychosocial:  No


Integumentary:  No


Blood Disorders:  No





Physical Exam


Vital Signs





Vital Signs - First Documented








 22





 17:55


 


Temp 36.3


 


Pulse 88


 


Resp 16


 


B/P (MAP) 166/89 (114)


 


Pulse Ox 98


 


O2 Delivery Room Air





Capillary Refill : Less Than 3 Seconds


Height/Weight/BMI


Height: 5'5.00"


Weight: 175lbs. 0.0oz. 79.963786pg; 29.00 BMI


Method:Stated





Progress/Results/Core Measures


Results/Orders


Lab Results





Laboratory Tests








Test


 22


18:14 22


19:01 22


21:41 Range/Units


 


 


White Blood Count


 10.0 


 


 


 4.3-11.0


10^3/uL


 


Red Blood Count


 4.73 


 


 


 3.80-5.11


10^6/uL


 


Hemoglobin 13.6    11.5-16.0  g/dL


 


Hematocrit 40    35-52  %


 


Mean Corpuscular Volume 84    80-99  fL


 


Mean Corpuscular Hemoglobin 29    25-34  pg


 


Mean Corpuscular Hemoglobin


Concent 34 


 


 


 32-36  g/dL





 


Red Cell Distribution Width 12.2    10.0-14.5  %


 


Platelet Count


 355 


 


 


 130-400


10^3/uL


 


Mean Platelet Volume 9.7    9.0-12.2  fL


 


Immature Granulocyte % (Auto) 0     %


 


Neutrophils (%) (Auto) 80 H   42-75  %


 


Lymphocytes (%) (Auto) 14    12-44  %


 


Monocytes (%) (Auto) 5    0-12  %


 


Eosinophils (%) (Auto) 1    0-10  %


 


Basophils (%) (Auto) 1    0-10  %


 


Neutrophils # (Auto)


 8.0 H


 


 


 1.8-7.8


10^3/uL


 


Lymphocytes # (Auto)


 1.4 


 


 


 1.0-4.0


10^3/uL


 


Monocytes # (Auto)


 0.5 


 


 


 0.0-1.0


10^3/uL


 


Eosinophils # (Auto)


 0.1 


 


 


 0.0-0.3


10^3/uL


 


Basophils # (Auto)


 0.1 


 


 


 0.0-0.1


10^3/uL


 


Immature Granulocyte # (Auto)


 0.0 


 


 


 0.0-0.1


10^3/uL


 


Sodium Level 139    135-145  MMOL/L


 


Potassium Level 3.3 L   3.6-5.0  MMOL/L


 


Chloride Level 106      MMOL/L


 


Carbon Dioxide Level 20 L   21-32  MMOL/L


 


Anion Gap 13    5-14  MMOL/L


 


Blood Urea Nitrogen 8    7-18  MG/DL


 


Creatinine


 0.78 


 


 


 0.60-1.30


MG/DL


 


Estimat Glomerular Filtration


Rate 89 


 


 


  





 


BUN/Creatinine Ratio 10     


 


Glucose Level 119 H     MG/DL


 


Calcium Level 9.6    8.5-10.1  MG/DL


 


Corrected Calcium 9.3    8.5-10.1  MG/DL


 


Total Bilirubin 0.9    0.1-1.0  MG/DL


 


Aspartate Amino Transf


(AST/SGOT) 25 


 


 


 5-34  U/L





 


Alanine Aminotransferase


(ALT/SGPT) 33 


 


 


 0-55  U/L





 


Alkaline Phosphatase 73      U/L


 


Troponin I < 0.028    <0.028  NG/ML


 


C-Reactive Protein High


Sensitivity 0.08 


 


 


 0.00-0.50


MG/DL


 


Total Protein 7.0    6.4-8.2  GM/DL


 


Albumin 4.4    3.2-4.5  GM/DL


 


Influenza Type A (RT-PCR)  Not Detected   Not Detecte  


 


Influenza Type B (RT-PCR)  Not Detected   Not Detecte  


 


SARS-CoV-2 RNA (RT-PCR)  Not Detected   Not Detecte  


 


Urine Color   YELLOW   


 


Urine Clarity   CLEAR   


 


Urine pH   5.5  5-9  


 


Urine Specific Gravity   1.010 L 1.016-1.022  


 


Urine Protein   NEGATIVE  NEGATIVE  


 


Urine Glucose (UA)   NEGATIVE  NEGATIVE  


 


Urine Ketones   NEGATIVE  NEGATIVE  


 


Urine Nitrite   NEGATIVE  NEGATIVE  


 


Urine Bilirubin   NEGATIVE  NEGATIVE  


 


Urine Urobilinogen   1.0  < = 1.0  MG/DL


 


Urine Leukocyte Esterase   NEGATIVE  NEGATIVE  


 


Urine RBC (Auto)   1+ H NEGATIVE  


 


Urine RBC   NONE   /HPF


 


Urine WBC   2-5   /HPF


 


Urine Squamous Epithelial


Cells 


 


 0-2 


  /HPF





 


Urine Renal Epithelial Cells   NONE   /HPF


 


Urine Crystals   NONE   /LPF


 


Urine Bacteria   NEGATIVE   /HPF


 


Urine Casts   NONE   /LPF


 


Urine Mucus   NEGATIVE   /LPF


 


Urine Culture Indicated   NO   








My Orders





Orders - ARUNA BENTLEY APRN


Ua Culture If Indicated (22 18:10)


Cbc With Automated Diff (22 18:19)


Comprehensive Metabolic Panel (22 18:19)


Hs C Reactive Protein (22 18:19)


Troponin I Dutch (22 18:19)


Covid 19 Inhouse Test (22 18:57)


Influenza A And B By Pcr (22 18:57)


Acute Abd Series (22 18:57)


Ondansetron Injection (Zofran Injectio (22 19:00)


Simethicone Tablet (Mylicon Chewable Tab (22 20:00)


Metoclopramide Injection (Reglan Injecti (22 20:00)


Ct Abdomen/Pelvis W (22 20:53)


Fentanyl  Inj (Sublimaze Injection) (22 21:00)


Iohexol Injection (Omnipaque 350 Mg/Ml 1 (22 21:15)


Received Contrast (Hold Metformin- Contr (22 21:15)


Ns (Ivpb) (Sodium Chloride 0.9% Ivpb Bag (22 21:15)


Ciprofloxacin Tablet (Cipro Tablet) (22 22:15)





Medications Given in ED





Current Medications








 Medications  Dose


 Ordered  Sig/Angella


 Route  Start Time


 Stop Time Status Last Admin


Dose Admin


 


 Ciprofloxacin  500 mg  BID  ONCE


 PO  22 22:15


 22 22:16 DC 22 22:17


500 MG


 


 Fentanyl Citrate  50 mcg  ONCE  ONCE


 IVP  22 21:00


 22 21:01 DC 22 21:00


50 MCG


 


 Iohexol  100 ml  ONCE  ONCE


 IV  22 21:15


 22 21:16 DC 22 21:08


80 ML


 


 Metoclopramide HCl  10 mg  ONCE  ONCE


 IVP  22 20:00


 22 20:01 DC 22 20:03


10 MG


 


 Ondansetron HCl  4 mg  ONCE  ONCE


 IVP  22 19:00


 22 19:01 DC 22 19:19


4 MG


 


 Simethicone  160 mg  ONCE  ONCE


 PO  22 20:00


 22 20:01 DC 22 20:08


160 MG


 


 Sodium Chloride  100 ml  ONCE  ONCE


 IV  22 21:15


 22 21:16 DC 22 21:08


80 ML








Vital Signs/I&O











 22





 17:55


 


Temp 36.3


 


Pulse 88


 


Resp 16


 


B/P (MAP) 166/89 (114)


 


Pulse Ox 98


 


O2 Delivery Room Air














Blood Pressure Mean:                    114











Departure


Impression





   Primary Impression:  


   Enteritis


Disposition:   HOME, SELF-CARE


Condition:  Improved





Departure-Patient Inst.


Decision time for Depature:  22:05


Referrals:  


MILADIS HORTA DO (PCP/Family)


Primary Care Physician


Patient Instructions:  Diarrhea, Adult ED, Colitis (DC)





Add. Discharge Instructions:  


Plan: 


1. Follow-up with your primary care provider if your symptoms persist.


2.  Take ciprofloxacin 1 tablet by mouth twice a day for 7 days.


3.  You can take an over-the-counter probiotic as well to replenish the healthy 

gut denae.


4.  Make sure you are drinking plenty of fluids to stay hydrated.


5.  You can take hydrocodone as needed every 6 hours for pain. Do not drive 

while taking. 


6.  Return to the ER for any new, concerning, worsening symptoms.





All discharge instructions reviewed with patient and/or family. Voiced 

understanding.


Scripts


Promethazine HCl (Promethazine Tablet) 25 Mg Tablet


25 MG PO Q6H PRN for NAUSEA/VOMITING, #10 TAB 0 Refills


   Prov: ARUNA BENTLEY APRN         22 


Ciprofloxacin HCl (Ciprofloxacin HCl) 500 Mg Tablet


500 MG PO BID for 7 Days, #14 TAB 0 Refills


   Prov: ARUNA BENTLEY APRN         22 


Hydrocodone/Acetaminophen (Hydrocodone-Acetamin 5-325 mg) 5 Mg-325 Mg Tablet


1 TAB PO Q6H PRN for PAIN-MODERATE (5-7), #10 TAB 0 Refills


   Prov: ARUNA BENTLEY APRN         22











ARUNA BENTLEY APRN            2022 19:00

## 2022-11-21 ENCOUNTER — HOSPITAL ENCOUNTER (EMERGENCY)
Dept: HOSPITAL 75 - ER | Age: 57
Discharge: HOME | End: 2022-11-21
Payer: COMMERCIAL

## 2022-11-21 VITALS — SYSTOLIC BLOOD PRESSURE: 117 MMHG | DIASTOLIC BLOOD PRESSURE: 73 MMHG

## 2022-11-21 VITALS — BODY MASS INDEX: 29.16 KG/M2 | HEIGHT: 65 IN | WEIGHT: 175.05 LBS

## 2022-11-21 DIAGNOSIS — Z28.310: ICD-10-CM

## 2022-11-21 DIAGNOSIS — R10.84: ICD-10-CM

## 2022-11-21 DIAGNOSIS — R19.7: Primary | ICD-10-CM

## 2022-11-21 LAB
ALBUMIN SERPL-MCNC: 4.6 GM/DL (ref 3.2–4.5)
ALP SERPL-CCNC: 180 U/L (ref 40–136)
ALT SERPL-CCNC: 86 U/L (ref 0–55)
BASOPHILS # BLD AUTO: 0.1 10^3/UL (ref 0–0.1)
BASOPHILS NFR BLD AUTO: 1 % (ref 0–10)
BILIRUB SERPL-MCNC: 1.1 MG/DL (ref 0.1–1)
BUN/CREAT SERPL: 24
CALCIUM SERPL-MCNC: 9.8 MG/DL (ref 8.5–10.1)
CHLORIDE SERPL-SCNC: 101 MMOL/L (ref 98–107)
CO2 SERPL-SCNC: 21 MMOL/L (ref 21–32)
CREAT SERPL-MCNC: 0.85 MG/DL (ref 0.6–1.3)
EOSINOPHIL # BLD AUTO: 0.2 10^3/UL (ref 0–0.3)
EOSINOPHIL NFR BLD AUTO: 2 % (ref 0–10)
GFR SERPLBLD BASED ON 1.73 SQ M-ARVRAT: 80 ML/MIN
GLUCOSE SERPL-MCNC: 122 MG/DL (ref 70–105)
HCT VFR BLD CALC: 43 % (ref 35–52)
HGB BLD-MCNC: 15.1 G/DL (ref 11.5–16)
LYMPHOCYTES # BLD AUTO: 1.6 10^3/UL (ref 1–4)
LYMPHOCYTES NFR BLD AUTO: 17 % (ref 12–44)
MANUAL DIFFERENTIAL PERFORMED BLD QL: NO
MCH RBC QN AUTO: 28 PG (ref 25–34)
MCHC RBC AUTO-ENTMCNC: 35 G/DL (ref 32–36)
MCV RBC AUTO: 81 FL (ref 80–99)
MONOCYTES # BLD AUTO: 0.7 10^3/UL (ref 0–1)
MONOCYTES NFR BLD AUTO: 7 % (ref 0–12)
NEUTROPHILS # BLD AUTO: 6.6 10^3/UL (ref 1.8–7.8)
NEUTROPHILS NFR BLD AUTO: 72 % (ref 42–75)
PLATELET # BLD: 332 10^3/UL (ref 130–400)
PMV BLD AUTO: 11.5 FL (ref 9–12.2)
POTASSIUM SERPL-SCNC: 3.5 MMOL/L (ref 3.6–5)
PROT SERPL-MCNC: 7.4 GM/DL (ref 6.4–8.2)
SODIUM SERPL-SCNC: 135 MMOL/L (ref 135–145)
WBC # BLD AUTO: 9.2 10^3/UL (ref 4.3–11)

## 2022-11-21 PROCEDURE — 99284 EMERGENCY DEPT VISIT MOD MDM: CPT

## 2022-11-21 PROCEDURE — 80053 COMPREHEN METABOLIC PANEL: CPT

## 2022-11-21 PROCEDURE — 85025 COMPLETE CBC W/AUTO DIFF WBC: CPT

## 2022-11-21 PROCEDURE — 36415 COLL VENOUS BLD VENIPUNCTURE: CPT

## 2022-11-21 NOTE — ED GI
General


Chief Complaint:  Abdominal/GI Problems


Stated Complaint:  STOMACH CRAMPS|VOMITING|DIARRHEA


Source of Information:  Patient


Exam Limitations:  No Limitations


 (VIKTORIA CARVAJAL)





History of Present Illness


Date Seen by Provider:  2022


Time Seen by Provider:  08:44


Initial Comments


Patient is a 58 y/o F with history of cholecystectomy who presents to the ER 

with CC of N/V/D onset this morning at 4 AM. Patient reports that she woke up 

this morning and had one episode of vomiting and one episode of diarrhea. She 

states she threw up "stomach acid" that was clear and had very loose, watery 

dark brown stools. After vomiting, she states she took Phenergan and nausea was 

resolved. She also states that she feels like there is a lot of gas in her 

abdomen with pain mostly over the left side of the abdomen. She notes she has 

not been eating or drinking well and feels dehydrated. She also feels weak and 

fatigued most of the time. Denies fever. 





Patient reports being in the [facility] ER on 2022 for the same complaint

of N/V/D. She states she had Xrays and contrast CTs done at that time. She was 

diagnosed with Enteritis and given Phenergan, Ciprofloxacin, and pain meds. 

Since then she states the symptoms have continued. 





Patient reports being negative for COVID and flu on 2022. Denies 

any sick contacts.


Timing/Duration:  1 Day


Severity/Quality:  Cramping


Location:  LUQ, LLQ


Associated Symptoms:  No Fever/Chills; Nausea/Vomiting, Weakness 

(VIKTORIA CARVAJAL)





Allergies and Home Medications


Allergies


Coded Allergies:  


     cephalexin (Verified  Allergy, Severe, RASH, 22)


     Penicillins (Verified  Allergy, Unknown, RASH, 16)


     amoxicillin (Verified  Allergy, Unknown, RASH, 16)


     ampicillin (Verified  Allergy, Unknown, RASH, 16)





Patient Home Medication List


Home Medication List Reviewed:  Yes


 (VIKTORIA CARVAJAL)


Amlodipine Besylate (Amlodipine Besylate) 10 Mg Tablet, 10 MG PO DAILY, 

(Reported)


   Entered as Reported by: SAI GUTIÉRREZ on 16 1012


Benazepril HCl (Benazepril HCl) 40 Mg Tab, 40 MG PO DAILY, (Reported)


   Entered as Reported by: SAI GUTIÉRREZ on 16 1012


Calcium Carbonate (Calcium) 500 Mg Tablet, 500 MG PO DAILY, (Reported)


   Entered as Reported by: SAI GUTIÉRREZ on 16 101


Cholecalciferol (Vitamin D3) (Vitamin D) 1,000 Unit Tablet, 1,000 UNIT PO DAILY,

(Reported)


   Entered as Reported by: SAI GUTIÉRREZ on 16 1012


Ciprofloxacin HCl (Ciprofloxacin HCl) 500 Mg Tablet, 500 MG PO BID


   Prescribed by: ARUNA BENTLEY on 22


Fenofibrate,Micronized (Fenofibrate) 134 Mg Capsule, 134 MG PO DAILY, (Reported)


   Entered as Reported by: SAI GUTIÉRREZ on 16 101


Hydrochlorothiazide (Hydrochlorothiazide) 12.5 Mg Tablet, 12.5 MG PO DAILY, 

(Reported)


   Entered as Reported by: SAI GUTIÉRREZ on 16 101


Hydrocodone/Acetaminophen (Hydrocodone-Acetamin 5-325 mg) 5 Mg-325 Mg Tablet, 1 

TAB PO Q6H PRN for PAIN-MODERATE (5-7)


   Prescribed by: ARUNA BENTLEY on 22


Multivitamin (Multi-Vitamin Daily) 1 Each Tablet, 1 EACH PO DAILY, (Reported)


   Entered as Reported by: SAI GUTIÉRREZ on 16 101


Omega 3 Polyunsat Fatty Acids (Fish Oil 1,000 mg Capsule) 1,000 Mg Cap, 1,000 MG

PO DAILY, (Reported)


   Entered as Reported by: SAI GUTIÉRREZ on 16 1012


Promethazine HCl (Promethazine Tablet) 25 Mg Tablet, 25 MG PO Q6H PRN for 

NAUSEA/VOMITING


   Prescribed by: ARUNA BENTLEY on 22 927


Vitamin E Mixed (Vitamin E) 400 Unit Tablet, 400 UNIT PO DAILY, (Reported)


   Entered as Reported by: JULIE A IBEH on 21 [id number]





Review of Systems


Review of Systems


Constitutional:  No chills, No fever; weakness


EENTM:  No Symptoms Reported


Gastrointestinal:  Abdomen Distended, Abdominal Pain, Diarrhea, Nausea, Poor 

Appetite, Poor Fluid Intake, Vomiting


Genitourinary:  No Symptoms Reported


Musculoskeletal:  no symptoms reported


Skin:  No pruritus, No rash (SUBBARALVAREZVIKTORIA)





Past Medical-Social-Family Hx


Patient Social History


Tobacco Use?:  No


Use of E-Cig and/or Vaping dev:  No


Substance use?:  No


Alcohol Use?:  No


 (VIKTORIA CARVAJAL)





Immunizations Up To Date


Tetanus Booster (TDap):  Unknown


First/Initial COVID19 Vaccinat:  NONE


Second COVID19 Vaccination Marcial:  NONE


Third COVID19 Vaccination Date:  NONE


COVID19 Vaccine :  NONE


 (DRECANDYALVAREZVIKTORIA)





Seasonal Allergies


Seasonal Allergies:  Yes


 (VIKTORIA CARVAJAL)





Past Medical History


Surgeries:  Yes


 Section, Gallbladder, Tonsillectomy


Respiratory:  No


Currently Using CPAP:  No


Currently Using BIPAP:  No


Cardiac:  Yes


High Cholesterol, Hypertension


Neurological:  No


Female Reproductive Disorders:  Denies


GYN History:  Menopausal


Genitourinary:  No


Gastrointestinal:  Yes


Gastroesophageal Reflux, Gall Bladder Disease


Musculoskeletal:  No


Endocrine:  No


HEENT:  No


Cancer:  No


Psychosocial:  No


Integumentary:  No


Blood Disorders:  No


 (ALENAVIKTORIA)





Physical Exam


Vital Signs





Vital Signs - First Documented








 22





 08:33


 


Temp 36.6


 


Pulse 98


 


Resp 20


 


B/P (MAP) 122/85 (97)


 


Pulse Ox 97


 


O2 Delivery Room Air








 (GRAYSON KOO MD)


Vital Signs


Capillary Refill :  


 (VIKTORIA CARVAJAL)


Height/Weight/BMI


Height: 5'5.00"


Weight: 175lbs. 0.0oz. 79.564592kr; 29.00 BMI


Method:Stated


General Appearance:  WD/WN, no apparent distress


Respiratory:  chest non-tender, lungs clear, normal breath sounds, no 

respiratory distress, no accessory muscle use


Cardiovascular:  regular rate, rhythm, no murmur


Gastrointestinal:  abnormal bowel sounds (hypoactive), distended, tenderness 

(LUQ/LLQ)


Extremities:  no pedal edema, no calf tenderness, normal capillary refill


Neurologic/Psychiatric:  alert, normal mood/affect, oriented x 3


Lymphatic:  no adenopathy (cervical ) (SUBROCKVIKTORIA)





Progress/Results/Core Measures


Results/Orders


Lab Results





Laboratory Tests








Test


 22


08:42 Range/Units


 


 


White Blood Count


 9.2 


 4.3-11.0


10^3/uL


 


Red Blood Count


 5.34 H


 3.80-5.11


10^6/uL


 


Hemoglobin 15.1  11.5-16.0  g/dL


 


Hematocrit 43  35-52  %


 


Mean Corpuscular Volume 81  80-99  fL


 


Mean Corpuscular Hemoglobin 28  25-34  pg


 


Mean Corpuscular Hemoglobin


Concent 35 


 32-36  g/dL





 


Red Cell Distribution Width 12.0  10.0-14.5  %


 


Platelet Count


 332 


 130-400


10^3/uL


 


Mean Platelet Volume 11.5  9.0-12.2  fL


 


Immature Granulocyte % (Auto) 0   %


 


Neutrophils (%) (Auto) 72  42-75  %


 


Lymphocytes (%) (Auto) 17  12-44  %


 


Monocytes (%) (Auto) 7  0-12  %


 


Eosinophils (%) (Auto) 2  0-10  %


 


Basophils (%) (Auto) 1  0-10  %


 


Neutrophils # (Auto)


 6.6 


 1.8-7.8


10^3/uL


 


Lymphocytes # (Auto)


 1.6 


 1.0-4.0


10^3/uL


 


Monocytes # (Auto)


 0.7 


 0.0-1.0


10^3/uL


 


Eosinophils # (Auto)


 0.2 


 0.0-0.3


10^3/uL


 


Basophils # (Auto)


 0.1 


 0.0-0.1


10^3/uL


 


Immature Granulocyte # (Auto)


 0.0 


 0.0-0.1


10^3/uL


 


Sodium Level 135  135-145  MMOL/L


 


Potassium Level 3.5 L 3.6-5.0  MMOL/L


 


Chloride Level 101    MMOL/L


 


Carbon Dioxide Level 21  21-32  MMOL/L


 


Anion Gap 13  5-14  MMOL/L


 


Blood Urea Nitrogen 20 H 7-18  MG/DL


 


Creatinine


 0.85 


 0.60-1.30


MG/DL


 


Estimat Glomerular Filtration


Rate 80 


  





 


BUN/Creatinine Ratio 24   


 


Glucose Level 122 H   MG/DL


 


Calcium Level 9.8  8.5-10.1  MG/DL


 


Corrected Calcium   8.5-10.1  MG/DL


 


Total Bilirubin 1.1 H 0.1-1.0  MG/DL


 


Aspartate Amino Transf


(AST/SGOT) 61 H


 5-34  U/L





 


Alanine Aminotransferase


(ALT/SGPT) 86 H


 0-55  U/L





 


Alkaline Phosphatase 180 H   U/L


 


Total Protein 7.4  6.4-8.2  GM/DL


 


Albumin 4.6 H 3.2-4.5  GM/DL





 (GRAYSON KOO MD)


My Orders





Orders - GRAYSON KOO MD


Ed Iv/Invasive Line Start (22 09:33)


Cbc With Automated Diff (22 09:33)


Comprehensive Metabolic Panel (22 09:33)


Ns Iv 1000 Ml (Sodium Chloride 0.9%) (22 09:45)


Ondansetron Injection (Zofran Injectio (22 09:45)


Stool Culture (22 09:33)


C Difficile Ag + Toxin A/B. (22 09:33)


Isolation Central Supply Req (22 09:33)


Dicyclomine Injection (Bentyl Injection) (22 10:34)


 (GRAYSON KOO MD)


Medications Given in ED





Current Medications








 Medications  Dose


 Ordered  Sig/Angella


 Route  Start Time


 Stop Time Status Last Admin


Dose Admin


 


 Ondansetron HCl  4 mg  ONCE  ONCE


 IVP  22 09:45


 22 09:46 DC 22 09:45


4 MG





 (GRAYSON KOO MD)


Vital Signs/I&O











 22





 08:33


 


Temp 36.6


 


Pulse 98


 


Resp 20


 


B/P (MAP) 122/85 (97)


 


Pulse Ox 97


 


O2 Delivery Room Air





 (GRAYSON KOO MD)





Progress


Progress Note :  


   Time:  11:47


Progress Note


Patient seen and evaluated by me, I performed an independent HPI, review of 

systems and physical exam.  I reviewed the medical student's note and agree with

the findings.





57-year-old female with persistent abdominal discomfort, nausea, vomiting, 

diarrhea.  No fevers or chills.  Recently completed a course of antibiotics for 

"enteritis".  Status postcholecystectomy in 2021 by Dr. Collins.  No fevers 

or chills, no chest pain, shortness of breath.





Basic laboratory studies obtained and reviewed, normal CBC, mildly elevated 

transaminases on comprehensive metabolic profile.  She has a relatively benign 

abdominal exam.  Complains of gaseous distention of her belly with a runny 

stool.  She does not have a history of recent travel, camping, well water.  No 

other sick contacts at home.  Discussed with Dr. Collins as the patient had 

cholelithiasis at the time of her cholecystectomy.  Suspect possibly retained 

common duct stone.  We are going to order an outpatient MRCP and patient will 

follow-up with Dr. Collins.  I am going to give her some pain and nausea 

medicines for home.





Vital signs are stable.  No clinical findings concerning for acute surgical pr

ocess such as appendicitis, bowel obstruction, acute pancreatitis, any other 

pathology.  She does not have any findings concerning for sepsis.





Will send prescriptions for hydrocodone and Zofran to her pharmacy.  Outpatient 

order for the MRCP.


 (GRAYSON KOO MD)





Departure


Impression





   Primary Impression:  


   Abdominal pain


   Qualified Codes:  R10.84 - Generalized abdominal pain


   Additional Impression:  


   Diarrhea


   Qualified Codes:  R19.7 - Diarrhea, unspecified


Disposition:  01 HOME, SELF-CARE


Condition:  Improved





Departure-Patient Inst.


Decision time for Depature:  11:49


 (GRAYSON KOO MD)


Referrals:  


MILADIS HORTA DO (PCP/Family)


Primary Care Physician


Patient Instructions:  Diarrhea in Adolescents and Adults





Add. Discharge Instructions:  


I have given you an outpatient order form for stool studies as well as an MRCP. 

You will need to contact scheduling for the imaging study.  The number is on the

order form.





Please call Dr. Collins's office after you have scheduled that to schedule an 

appointment with him for follow-up.





I have sent a prescription for pain medications as well as nausea medication to 

your pharmacy.  Take these as needed.  Hydrocodone can be habit-forming, take 

this only as needed for severe pain.  Do not drive and take this medication.





Avoid a high-fat diet as these can cause your symptoms to worsen.





You can take some Imodium for the diarrhea as long as you are not having blood 

in your stool or fever, temperature greater than 100.4.





Return to the emergency department for any new, concerning or emergent 

complaints.


Scripts


Ondansetron (Ondansetron Odt) 8 Mg Tab.rapdis


8 MG SL Q8H PRN for NAUSEA/VOMITING, #15 TAB


   Prov: GRAYSON KOO MD         22 


Hydrocodone/Acetaminophen (Hydrocodone-Acetamin 5-325 mg) 5 Mg-325 Mg Tablet


1 TAB PO Q6H PRN for PAIN-MODERATE (5-7), #15 TAB


   Prov: GRAYSON KOO MD         22


Work/School Note:  Work Release Form   Date Seen in the Emergency Department:  

2022


   Return to Work:  2022





Copy


Copies To 1:   ILYA COLLINS DO; MILADIS HORTA NATASHA               2022 08:59


GRAYSON KOO MD         2022 11:52

## 2022-11-22 ENCOUNTER — HOSPITAL ENCOUNTER (OUTPATIENT)
Dept: HOSPITAL 75 - LAB | Age: 57
End: 2022-11-22
Attending: EMERGENCY MEDICINE
Payer: COMMERCIAL

## 2022-11-22 DIAGNOSIS — R19.7: Primary | ICD-10-CM

## 2022-11-22 PROCEDURE — 87899 AGENT NOS ASSAY W/OPTIC: CPT

## 2022-11-22 PROCEDURE — 87324 CLOSTRIDIUM AG IA: CPT

## 2022-11-22 PROCEDURE — 87045 FECES CULTURE AEROBIC BACT: CPT

## 2022-11-22 PROCEDURE — 87449 NOS EACH ORGANISM AG IA: CPT

## 2022-11-22 PROCEDURE — 87015 SPECIMEN INFECT AGNT CONCNTJ: CPT

## 2022-11-22 PROCEDURE — 87046 STOOL CULTR AEROBIC BACT EA: CPT

## 2022-12-01 ENCOUNTER — HOSPITAL ENCOUNTER (OUTPATIENT)
Dept: HOSPITAL 75 - RAD | Age: 57
End: 2022-12-01
Attending: EMERGENCY MEDICINE
Payer: COMMERCIAL

## 2022-12-01 DIAGNOSIS — R19.7: ICD-10-CM

## 2022-12-01 DIAGNOSIS — Z90.49: ICD-10-CM

## 2022-12-01 DIAGNOSIS — R74.01: ICD-10-CM

## 2022-12-01 DIAGNOSIS — K76.0: Primary | ICD-10-CM

## 2022-12-01 PROCEDURE — 74181 MRI ABDOMEN W/O CONTRAST: CPT
